# Patient Record
Sex: FEMALE | Race: WHITE | NOT HISPANIC OR LATINO | Employment: FULL TIME | URBAN - METROPOLITAN AREA
[De-identification: names, ages, dates, MRNs, and addresses within clinical notes are randomized per-mention and may not be internally consistent; named-entity substitution may affect disease eponyms.]

---

## 2018-01-17 ENCOUNTER — APPOINTMENT (OUTPATIENT)
Dept: WOMENS IMAGING | Facility: HOSPITAL | Age: 53
End: 2018-01-17

## 2018-01-17 PROCEDURE — 77063 BREAST TOMOSYNTHESIS BI: CPT | Performed by: RADIOLOGY

## 2018-01-17 PROCEDURE — 77067 SCR MAMMO BI INCL CAD: CPT | Performed by: RADIOLOGY

## 2018-06-05 ENCOUNTER — OFFICE VISIT (OUTPATIENT)
Dept: FAMILY MEDICINE CLINIC | Facility: CLINIC | Age: 53
End: 2018-06-05

## 2018-06-05 VITALS
BODY MASS INDEX: 29.66 KG/M2 | WEIGHT: 189 LBS | RESPIRATION RATE: 15 BRPM | HEIGHT: 67 IN | SYSTOLIC BLOOD PRESSURE: 122 MMHG | DIASTOLIC BLOOD PRESSURE: 68 MMHG | TEMPERATURE: 98.7 F | OXYGEN SATURATION: 100 % | HEART RATE: 87 BPM

## 2018-06-05 DIAGNOSIS — Z23 NEED FOR TDAP VACCINATION: ICD-10-CM

## 2018-06-05 DIAGNOSIS — N39.3 STRESS INCONTINENCE: ICD-10-CM

## 2018-06-05 DIAGNOSIS — Z91.89 AT HIGH RISK FOR INFECTION: ICD-10-CM

## 2018-06-05 DIAGNOSIS — Z12.11 SCREENING FOR COLON CANCER: ICD-10-CM

## 2018-06-05 DIAGNOSIS — Z00.00 ENCOUNTER FOR HEALTH MAINTENANCE EXAMINATION: Primary | ICD-10-CM

## 2018-06-05 PROBLEM — B00.9 RECURRENT HERPES SIMPLEX: Status: ACTIVE | Noted: 2018-06-05

## 2018-06-05 PROCEDURE — 90715 TDAP VACCINE 7 YRS/> IM: CPT | Performed by: FAMILY MEDICINE

## 2018-06-05 PROCEDURE — 90471 IMMUNIZATION ADMIN: CPT | Performed by: FAMILY MEDICINE

## 2018-06-05 PROCEDURE — 99396 PREV VISIT EST AGE 40-64: CPT | Performed by: FAMILY MEDICINE

## 2018-06-05 RX ORDER — VALACYCLOVIR HYDROCHLORIDE 1 G/1
1000 TABLET, FILM COATED ORAL DAILY
Refills: 11 | COMMUNITY
Start: 2018-05-16

## 2018-06-05 RX ORDER — ESTRADIOL 0.1 MG/D
FILM, EXTENDED RELEASE TRANSDERMAL
Refills: 11 | COMMUNITY
Start: 2018-05-16 | End: 2020-01-14

## 2018-06-05 NOTE — PROGRESS NOTES
Subjective   Rachelle Corbin is a 52 y.o. female.     Into establish care for health maintenance evaluation.  This is generally healthy lady.  She complains today of problems with urinary stress incontinence.  She has had a vaginal hysterectomy and had 1 vaginal childbirth.  She does have a gynecologist that she hasn't seen regarding this.  One outbreak of herpes year.  That's now.  College he takes care of mammograms and bone density etc.  He and her  both are really seriously trying to develop a healthier lifestyle with more exercise.  She does go to the gym for spinning.  They are trying to watch her diet.  She is reading [employer] YE[employer] WOM[employer] and  is reading [employer] N[employer] YEAR.    She has never had a colonoscopy in needs tetanus updated.  Review of systems is negative other than as above.          The following portions of the patient's history were reviewed and updated as appropriate: allergies, current medications, past family history, past medical history, past social history, past surgical history and problem list.    Review of Systems   Constitutional: Negative.  Negative for chills and fever.   HENT: Negative.  Negative for congestion, rhinorrhea and sore throat.    Eyes: Negative.  Negative for discharge and visual disturbance.   Respiratory: Negative.  Negative for cough and shortness of breath.    Cardiovascular: Negative.  Negative for chest pain.   Gastrointestinal: Negative.  Negative for abdominal pain, constipation, diarrhea, nausea and vomiting.   Endocrine: Negative.  Negative for polydipsia.   Genitourinary: Positive for difficulty urinating (stress incontinence). Negative for dysuria and hematuria.        Genital herpes outbreak     Musculoskeletal: Negative.  Negative for arthralgias and myalgias.   Skin: Negative.  Negative for rash.   Allergic/Immunologic: Negative.    Neurological: Negative.  Negative for weakness, numbness and headaches.   Hematological: Negative.  Does  not bruise/bleed easily.   Psychiatric/Behavioral: Negative.  Negative for dysphoric mood. The patient is not nervous/anxious.    All other systems reviewed and are negative.      Objective   Physical Exam   Constitutional: She is oriented to person, place, and time. She appears well-developed and well-nourished.   HENT:   Head: Normocephalic and atraumatic.   Right Ear: External ear normal.   Left Ear: External ear normal.   Mouth/Throat: No oropharyngeal exudate.   Eyes: Conjunctivae, EOM and lids are normal. Pupils are equal, round, and reactive to light. Right eye exhibits no discharge. Left eye exhibits no discharge. No scleral icterus.   Neck: Trachea normal, normal range of motion and phonation normal. Neck supple. No thyromegaly present.   Cardiovascular: Normal rate, regular rhythm and normal heart sounds.  Exam reveals no gallop and no friction rub.    No murmur heard.  Pulmonary/Chest: Effort normal and breath sounds normal. No stridor. She has no wheezes. She has no rales.   Abdominal: Soft. She exhibits no distension. There is no tenderness.   Musculoskeletal: Normal range of motion. She exhibits no edema.   Lymphadenopathy:     She has no cervical adenopathy.   Neurological: She is alert and oriented to person, place, and time. She has normal strength. No cranial nerve deficit.   Skin: Skin is warm, dry and intact. No petechiae and no rash noted. No cyanosis. Nails show no clubbing.   Psychiatric: She has a normal mood and affect. Her speech is normal and behavior is normal. Judgment and thought content normal. Cognition and memory are normal.   Nursing note and vitals reviewed.    Over 40 minutes face time with over half of that in counseling and education.    Assessment/Plan   Rachelle was seen today for establish care and annual exam.    Diagnoses and all orders for this visit:    Encounter for health maintenance examination  -     Hemoglobin A1c; Future  -     CBC & Differential; Future  -      Comprehensive Metabolic Panel; Future  -     Lipid Panel; Future  -     Vitamin D 25 Hydroxy; Future  -     TSH Rfx On Abnormal To Free T4; Future  -     Hepatitis C Antibody; Future    Need for Tdap vaccination    Stress incontinence    Screening for colon cancer  -     Ambulatory Referral For Screening Colonoscopy    At high risk for infection  -     Hepatitis C Antibody; Future    Other orders  -     Tdap Vaccine Greater Than or Equal To 6yo IM      Patient Instructions   Keep reading your book--and implementing the diet and exercise things we talked about      Discuss bladder issues with your gynecologist    You were given a tetanus/diphtheria/pertussis booster vaccine today.  This is your once in a lifetime pertussis (whooping cough) booster.  In the future you will need only tetanus/diphtheria.    We will arrange for colonoscopy    I would strongly encourage you to sign up for My Chart using the access code provided with these papers.  This provides an excellent convenient way for you to communicate with us and with any of your [facility] physicians.  Lab and x-ray reports can be viewed, prescription refills and appointments may be requested, and you may send emails to your physician's office.      IT IS VERY IMPORTANT THAT YOU KEEP A PRINTED LIST OF ALL OF YOUR MEDICATIONS AND DOSES AND OF ALL MEDICATION ALLERGIES WITH YOU/ON YOUR PERSON AT ALL TIMES.  THIS IS OF CRITICAL IMPORTANCE IN THE EVENT THAT YOU ARE INJURED OR ILL AND ARE UNABLE TO CONVEY THIS INFORMATION TO THOSE CARING FOR YOU IN AN EMERGENCY SITUATION.            EMR Dragon/Transcription disclaimer:   Much of this encounter note is an electronic transcription/translation of spoken language to printed text. The electronic translation of spoken language may permit erroneous, or at times, nonsensical words or phrases to be inadvertently transcribed; Although I have reviewed the note for such errors, some may still exist. Please contact me with  any questions or concerns about the conduct of this encounter note.

## 2018-06-05 NOTE — PATIENT INSTRUCTIONS
Keep reading your book--and implementing the diet and exercise things we talked about      Discuss bladder issues with your gynecologist    You were given a tetanus/diphtheria/pertussis booster vaccine today.  This is your once in a lifetime pertussis (whooping cough) booster.  In the future you will need only tetanus/diphtheria.    We will arrange for colonoscopy    I would strongly encourage you to sign up for My Chart using the access code provided with these papers.  This provides an excellent convenient way for you to communicate with us and with any of your [facility] physicians.  Lab and x-ray reports can be viewed, prescription refills and appointments may be requested, and you may send emails to your physician's office.      IT IS VERY IMPORTANT THAT YOU KEEP A PRINTED LIST OF ALL OF YOUR MEDICATIONS AND DOSES AND OF ALL MEDICATION ALLERGIES WITH YOU/ON YOUR PERSON AT ALL TIMES.  THIS IS OF CRITICAL IMPORTANCE IN THE EVENT THAT YOU ARE INJURED OR ILL AND ARE UNABLE TO CONVEY THIS INFORMATION TO THOSE CARING FOR YOU IN AN EMERGENCY SITUATION.

## 2018-06-06 ENCOUNTER — RESULTS ENCOUNTER (OUTPATIENT)
Dept: FAMILY MEDICINE CLINIC | Facility: CLINIC | Age: 53
End: 2018-06-06

## 2018-06-06 DIAGNOSIS — Z00.00 ENCOUNTER FOR HEALTH MAINTENANCE EXAMINATION: ICD-10-CM

## 2018-06-06 DIAGNOSIS — Z91.89 AT HIGH RISK FOR INFECTION: ICD-10-CM

## 2018-06-13 LAB
25(OH)D3+25(OH)D2 SERPL-MCNC: 37.3 NG/ML (ref 30–100)
ALBUMIN SERPL-MCNC: 4.4 G/DL (ref 3.5–5.2)
ALBUMIN/GLOB SERPL: 1.5 G/DL
ALP SERPL-CCNC: 112 U/L (ref 39–117)
ALT SERPL-CCNC: 13 U/L (ref 1–33)
AST SERPL-CCNC: 14 U/L (ref 1–32)
BASOPHILS # BLD AUTO: 0.08 10*3/MM3 (ref 0–0.2)
BASOPHILS NFR BLD AUTO: 1.3 % (ref 0–1.5)
BILIRUB SERPL-MCNC: 0.4 MG/DL (ref 0.1–1.2)
BUN SERPL-MCNC: 13 MG/DL (ref 6–20)
BUN/CREAT SERPL: 15.1 (ref 7–25)
CALCIUM SERPL-MCNC: 9.9 MG/DL (ref 8.6–10.5)
CHLORIDE SERPL-SCNC: 104 MMOL/L (ref 98–107)
CHOLEST SERPL-MCNC: 163 MG/DL (ref 0–200)
CO2 SERPL-SCNC: 27.3 MMOL/L (ref 22–29)
CREAT SERPL-MCNC: 0.86 MG/DL (ref 0.57–1)
EOSINOPHIL # BLD AUTO: 0.27 10*3/MM3 (ref 0–0.7)
EOSINOPHIL NFR BLD AUTO: 4.5 % (ref 0.3–6.2)
ERYTHROCYTE [DISTWIDTH] IN BLOOD BY AUTOMATED COUNT: 13.4 % (ref 11.7–13)
GFR SERPLBLD CREATININE-BSD FMLA CKD-EPI: 69 ML/MIN/1.73
GFR SERPLBLD CREATININE-BSD FMLA CKD-EPI: 84 ML/MIN/1.73
GLOBULIN SER CALC-MCNC: 2.9 GM/DL
GLUCOSE SERPL-MCNC: 96 MG/DL (ref 65–99)
HBA1C MFR BLD: 4.8 % (ref 4.8–5.6)
HCT VFR BLD AUTO: 44.5 % (ref 35.6–45.5)
HCV AB S/CO SERPL IA: <0.1 S/CO RATIO (ref 0–0.9)
HDLC SERPL-MCNC: 72 MG/DL (ref 40–60)
HGB BLD-MCNC: 14.4 G/DL (ref 11.9–15.5)
IMM GRANULOCYTES # BLD: 0.01 10*3/MM3 (ref 0–0.03)
IMM GRANULOCYTES NFR BLD: 0.2 % (ref 0–0.5)
LDLC SERPL CALC-MCNC: 76 MG/DL (ref 0–100)
LYMPHOCYTES # BLD AUTO: 1.78 10*3/MM3 (ref 0.9–4.8)
LYMPHOCYTES NFR BLD AUTO: 29.8 % (ref 19.6–45.3)
MCH RBC QN AUTO: 31.2 PG (ref 26.9–32)
MCHC RBC AUTO-ENTMCNC: 32.4 G/DL (ref 32.4–36.3)
MCV RBC AUTO: 96.5 FL (ref 80.5–98.2)
MONOCYTES # BLD AUTO: 0.47 10*3/MM3 (ref 0.2–1.2)
MONOCYTES NFR BLD AUTO: 7.9 % (ref 5–12)
NEUTROPHILS # BLD AUTO: 3.38 10*3/MM3 (ref 1.9–8.1)
NEUTROPHILS NFR BLD AUTO: 56.5 % (ref 42.7–76)
PLATELET # BLD AUTO: 272 10*3/MM3 (ref 140–500)
POTASSIUM SERPL-SCNC: 4.4 MMOL/L (ref 3.5–5.2)
PROT SERPL-MCNC: 7.3 G/DL (ref 6–8.5)
RBC # BLD AUTO: 4.61 10*6/MM3 (ref 3.9–5.2)
SODIUM SERPL-SCNC: 142 MMOL/L (ref 136–145)
TRIGL SERPL-MCNC: 77 MG/DL (ref 0–150)
TSH SERPL DL<=0.005 MIU/L-ACNC: 3.76 MIU/ML (ref 0.27–4.2)
VLDLC SERPL CALC-MCNC: 15.4 MG/DL (ref 5–40)
WBC # BLD AUTO: 5.98 10*3/MM3 (ref 4.5–10.7)

## 2018-08-31 ENCOUNTER — OUTSIDE FACILITY SERVICE (OUTPATIENT)
Dept: SURGERY | Facility: CLINIC | Age: 53
End: 2018-08-31

## 2018-08-31 PROCEDURE — 45378 DIAGNOSTIC COLONOSCOPY: CPT | Performed by: SURGERY

## 2018-12-12 ENCOUNTER — OFFICE VISIT (OUTPATIENT)
Dept: FAMILY MEDICINE CLINIC | Facility: CLINIC | Age: 53
End: 2018-12-12

## 2018-12-12 VITALS
RESPIRATION RATE: 16 BRPM | SYSTOLIC BLOOD PRESSURE: 120 MMHG | DIASTOLIC BLOOD PRESSURE: 88 MMHG | HEART RATE: 84 BPM | HEIGHT: 67 IN | WEIGHT: 191.6 LBS | TEMPERATURE: 98.8 F | OXYGEN SATURATION: 99 % | BODY MASS INDEX: 30.07 KG/M2

## 2018-12-12 DIAGNOSIS — L50.9 URTICARIA: ICD-10-CM

## 2018-12-12 DIAGNOSIS — J06.9 VIRAL URI: Primary | ICD-10-CM

## 2018-12-12 PROCEDURE — 99213 OFFICE O/P EST LOW 20 MIN: CPT | Performed by: FAMILY MEDICINE

## 2018-12-12 RX ORDER — HYDROXYZINE HYDROCHLORIDE 10 MG/1
TABLET, FILM COATED ORAL
Qty: 40 TABLET | Refills: 0 | Status: SHIPPED | OUTPATIENT
Start: 2018-12-12 | End: 2020-02-05

## 2018-12-12 NOTE — PATIENT INSTRUCTIONS
Vaporizer/humidifier to provide enough humidity in sleeping room that you can awaken in the morning without having dry mouth or nose.  Continue this until the air conditioning comes on in the spring.  Will need to keep clean to avoid mold.  Ultrasonic cool mist vaporizers are very effective and inexpensive.    You may use EITHER OTC Dimetapp Childrens Cold & Allergy (for drainage and congestion) OR Dimetapp Childrens Cold & Cough (for drainage, congestion and cough).  Store/generic brand is as good as brand name product.     Recheck for persistence or for worsening, especially after initial improvement     Use the Excedrin Migraine supplemented with ibuprofen 600 mg (3 of the OTC tabs) every 6 hours    For itching, use EITHER Claritin/loratadine OR Zyrtec/cetirizine daily for next week PLUS Zantac/ranitidine 150 mg twice daily for next week.  If needed, add rx antihistamine.    Moisturize with Lubriderm or Eucerin

## 2019-11-12 ENCOUNTER — APPOINTMENT (OUTPATIENT)
Dept: WOMENS IMAGING | Facility: HOSPITAL | Age: 54
End: 2019-11-12

## 2019-11-12 PROCEDURE — 77067 SCR MAMMO BI INCL CAD: CPT | Performed by: RADIOLOGY

## 2019-11-12 PROCEDURE — 77063 BREAST TOMOSYNTHESIS BI: CPT | Performed by: RADIOLOGY

## 2019-11-22 ENCOUNTER — APPOINTMENT (OUTPATIENT)
Dept: WOMENS IMAGING | Facility: HOSPITAL | Age: 54
End: 2019-11-22

## 2019-11-22 PROCEDURE — 77065 DX MAMMO INCL CAD UNI: CPT | Performed by: RADIOLOGY

## 2019-11-22 PROCEDURE — 77061 BREAST TOMOSYNTHESIS UNI: CPT | Performed by: RADIOLOGY

## 2019-12-18 ENCOUNTER — HOSPITAL ENCOUNTER (OUTPATIENT)
Dept: MAMMOGRAPHY | Facility: HOSPITAL | Age: 54
Discharge: HOME | End: 2019-12-18
Admitting: RADIOLOGY

## 2019-12-18 VITALS
HEART RATE: 76 BPM | HEIGHT: 67 IN | TEMPERATURE: 96.6 F | BODY MASS INDEX: 26.37 KG/M2 | DIASTOLIC BLOOD PRESSURE: 85 MMHG | SYSTOLIC BLOOD PRESSURE: 132 MMHG | WEIGHT: 168 LBS | RESPIRATION RATE: 20 BRPM | OXYGEN SATURATION: 99 %

## 2019-12-18 DIAGNOSIS — N63.20 LEFT BREAST MASS: ICD-10-CM

## 2019-12-18 PROCEDURE — 77065 DX MAMMO INCL CAD UNI: CPT

## 2019-12-18 RX ORDER — DIAZEPAM 5 MG/1
5 TABLET ORAL ONCE AS NEEDED
Status: DISCONTINUED | OUTPATIENT
Start: 2019-12-18 | End: 2019-12-18

## 2019-12-18 NOTE — NURSING NOTE
Patient was positioned and repositioned in multiple attempts to visualize the calcifications. Unable to complete procedure due to location of calcifications and limitations of positioning. Dr. Pickering reviewed images in depth with patient and her  and discussed options. Patient will speak with Dr. Somers.

## 2019-12-18 NOTE — H&P
Name: Rachelle Corbin ADMIT: 2019   : 1965  PCP: Juan Miguel Oneill MD    MRN: [id number] LOS: 0 days   AGE/SEX: 54 y.o. female  ROOM: Room/bed info not found       Chief complaint left breast calcifications    Present Illness or Internal History:  Patient is a 54 y.o. female presents with left breast calcifications     Past Surgical History:  Past Surgical History:   Procedure Laterality Date   • BREAST BIOPSY Right     Benign   • BREAST LUMPECTOMY Right     Benign   • LACRIMAL DUCT PROBING W/ STENT PLACEMENT Right    • TONSILLECTOMY     • VAGINAL HYSTERECTOMY         Past Medical History:  Past Medical History:   Diagnosis Date   • History of iron deficiency anemia     Menorrhagia prior to hysterectomy   • Kidney stones    • Stress incontinence        Home Medications:    (Not in a hospital admission)    Allergies:  Patient has no known allergies.    Family History:  Family History   Problem Relation Age of Onset   • Coronary artery disease Father 48         at 54   • Coronary artery disease Maternal Grandmother         fatal MI at 70   • Coronary artery disease Paternal Grandfather    • Thyroid cancer Sister    • Breast cancer Neg Hx    • Colon cancer Neg Hx    • Diabetes Neg Hx    • Hypotension Neg Hx    • Hypertension Neg Hx        Social History:  Social History     Tobacco Use   • Smoking status: Former Smoker   • Smokeless tobacco: Never Used   • Tobacco comment: 25 yrs quit   Substance Use Topics   • Alcohol use: Not on file     Comment: 3 drinks/week   • Drug use: No        Objective     Physical Exam:    No exam performed today,    Vital Signs  Temp:  [96.6 °F (35.9 °C)] 96.6 °F (35.9 °C)  Heart Rate:  [76] 76  Resp:  [20] 20  BP: (132)/(85) 132/85    Anticipated Surgical Procedure:  Stereotactic guided left breast biopsy with clip placement    The risks, benefits and alternatives of this procedure have been discussed with the patient or responsible party: Yes        Kevyn GARIBAY  Jr. Pickering MD  12/18/19  10:07 AM

## 2020-01-14 ENCOUNTER — PREP FOR SURGERY (OUTPATIENT)
Dept: OTHER | Facility: HOSPITAL | Age: 55
End: 2020-01-14

## 2020-01-14 ENCOUNTER — OFFICE VISIT (OUTPATIENT)
Dept: MAMMOGRAPHY | Facility: CLINIC | Age: 55
End: 2020-01-14

## 2020-01-14 VITALS
DIASTOLIC BLOOD PRESSURE: 80 MMHG | BODY MASS INDEX: 25.76 KG/M2 | HEIGHT: 68 IN | WEIGHT: 170 LBS | SYSTOLIC BLOOD PRESSURE: 130 MMHG

## 2020-01-14 DIAGNOSIS — R92.1 BREAST CALCIFICATIONS: Primary | ICD-10-CM

## 2020-01-14 DIAGNOSIS — R92.1 BREAST CALCIFICATIONS ON MAMMOGRAM: Primary | ICD-10-CM

## 2020-01-14 PROCEDURE — 99204 OFFICE O/P NEW MOD 45 MIN: CPT | Performed by: SURGERY

## 2020-01-14 RX ORDER — CEFAZOLIN SODIUM 2 G/100ML
2 INJECTION, SOLUTION INTRAVENOUS ONCE
Status: CANCELLED | OUTPATIENT
Start: 2020-02-12 | End: 2020-01-14

## 2020-01-14 NOTE — PROGRESS NOTES
Chief Complaint: Rachelle Corbin is a 54 y.o.. female here today for Abnormal Breast Imaging        History of Present Illness:  Patient presents with abnormal breast imaging. Unsuccessful attempt at a left breast biopsy. Referred to Dr. Eduardo to excise.     She is a nice 54-year-old white female who does a nice job of yearly mammography.  She does have dense breast tissue and on her most recent mammograms she was noted to have some punctate calcifications with a grouped distribution in the posterior one third of the left breast in the upper outer quadrant.  These were apparently new since 2008.  Biopsy of these was recommended but after significant attempts to do this, the patient was unable to have a stereotactic biopsy performed.  She is here today to talk about the possibility of a needle localized excisional biopsy.  The patient denies any symptoms related to her breasts such as nipple discharge or breast pain or breast mass.  I have personally reviewed the imaging studies.  The calcifications are rather deep in the breast but appeared to be pleomorphic and somewhat clustered or at least indeterminate.    The patient has no prior history of breast biopsy.  Her family history is negative for breast or ovarian cancer.  She is currently not on any hormone replacement therapy.      Review of Systems:  Review of Systems   Skin:        The patient denies any noticeable changes to the skin of the breast.    All other systems reviewed and are negative.     I have reviewed the ROS as documented by the MA/LPN/RN Harshad Eduardo MD      Past Medical and Surgical History:  Breast Biopsy History:  Patient has had the following breast biopsies:2004 Right-benign  Breast Cancer HIstory:  Patient does not have a past medical history of breast cancer.  Breast Operations, and year:  2004 Lumpectomy-benign  Social History     Tobacco Use   Smoking Status Former Smoker   Smokeless Tobacco Never Used   Tobacco Comment    25  yrs quit     Obstetric History:  Patient is postmenopausal due to removal of her uterus in the following year:   Number of pregnancies:1  Number of live births: 1  Number of abortions or miscarriages: 0  Age of delivery of first child: 30  Patient breast fed, for the following lenth of time:6 months  Length of time taking birth control pills:5 plus years  Patient is presently taking the following hormone replacement:Estrogel x 3 weeks    Past Surgical History:   Procedure Laterality Date   • BREAST BIOPSY Right     Benign   • BREAST LUMPECTOMY Right     Benign   • LACRIMAL DUCT PROBING W/ STENT PLACEMENT Right    • TONSILLECTOMY     • VAGINAL HYSTERECTOMY         Past Medical History:   Diagnosis Date   • History of iron deficiency anemia     Menorrhagia prior to hysterectomy   • Kidney stones    • Stress incontinence        Prior Hospitalizations, other than for surgery or childbirth, and year:  None    Social History:  Patient is .  Patient has one sons.    Family History:  Family History   Problem Relation Age of Onset   • Coronary artery disease Father 48         at 54   • Coronary artery disease Maternal Grandmother         fatal MI at 70   • Coronary artery disease Paternal Grandfather    • Thyroid cancer Sister    • Lung cancer Paternal Uncle    • Breast cancer Neg Hx    • Colon cancer Neg Hx    • Diabetes Neg Hx    • Hypotension Neg Hx    • Hypertension Neg Hx        Vital Signs:  Vitals:    20 1309   BP: 130/80       Medications:    Current Outpatient Prescriptions:     Current Outpatient Medications:   •  Estradiol (ESTROGEL) 0.75 MG/1.25 GM (0.06%) topical gel, Place 1.25 g on the skin as directed by provider Daily., Disp: , Rfl:   •  hydrOXYzine (ATARAX) 10 MG tablet, 1-3 tabs PO Q6H prn itching, Disp: 40 tablet, Rfl: 0  •  valACYclovir (VALTREX) 1000 MG tablet, Take 1,000 mg by mouth Daily., Disp: , Rfl: 11    Physical Examination:  General Appearance:   Patient is in no distress.   She is well kept and has an average build.   Psychiatric:  Patient with appropriate mood and affect. Alert and oriented to self, time, and place.    Breast, RIGHT:  large sized, symmetric with the contralateral side.  Breast skin is without erythema, edema, rashes.  There are no visible abnormalities upon inspection during the arm-raising maneuver or with hands on hips in the sitting position. There is no nipple retraction, discharge or nipple/areolar skin changes.There are no masses palpable in the sitting or supine positions.  She does have rather dense breast tissue but no dominant masses.    Breast, LEFT:  large sized, symmetric with the contralateral side.  Breast skin is without erythema, edema, rashes.  There are no visible abnormalities upon inspection during the arm-raising maneuver or with hands on hips in the sitting position. There is no nipple retraction, discharge or nipple/areolar skin changes.There are no masses palpable in the sitting or supine positions.  She does have dense breast tissue particularly in the upper outer quadrants.    Lymphatic:  There is no axillary, cervical, infraclavicular, or supraclavicular adenopathy bilaterally.  Eyes:  Pupils are round and reactive to light.  Cardiovascular:  Heart rate and rhythm are regular.  Respiratory:  Lungs are clear bilaterally with no crackles or wheezes in any lung field.  Gastrointestinal:  Abdomen is soft, nondistended, and nontender.  There was no obvious hepatosplenomegaly or abdominal mass.  There are  scars from previous hysterectomy.    Musculoskeletal:  Good strength in all 4 extremities.   There is good range of motion in both shoulders.    Skin:  No new skin lesions or rashes on the skin excluding the breast (see breast exam above).    Assessment:  1. Breast calcifications on mammogram          Plan:  We had a nice discussion regarding calcifications today.  The patient understands that these calcifications typically present because of  ductal obstruction.  There are patterns that are more suspicious than others and hers would seem to fit that.  Unfortunately they are deep in the breast and fairly high in the upper outer quadrant.  This makes access to them rather difficult with the stereotactic approach.  I have described what is involved with a needle localized excisional biopsy.  The patient does understand the small risk of infection, bleeding, or an anesthetic complication.  She does wish to proceed and those arrangements are being made.      CPT coding:    Next Appointment:  No follow-ups on file.            EMR Dragon/transcription disclaimer:    Much of this encounter note is an electronic transcription/translocation of spoken language to printed text.  The electronic translation of spoken language may permit erroneous, or at times, nonsensical words or phrases to be inadvertently transcribed.  Although I have reviewed the note from such areas, some may still exist.

## 2020-01-15 ENCOUNTER — TELEPHONE (OUTPATIENT)
Dept: MAMMOGRAPHY | Facility: CLINIC | Age: 55
End: 2020-01-15

## 2020-01-15 ENCOUNTER — TRANSCRIBE ORDERS (OUTPATIENT)
Dept: ADMINISTRATIVE | Facility: HOSPITAL | Age: 55
End: 2020-01-15

## 2020-01-15 PROBLEM — R92.1 BREAST CALCIFICATIONS: Status: ACTIVE | Noted: 2020-01-15

## 2020-01-15 NOTE — TELEPHONE ENCOUNTER
Patient scheduled for PAT on 2/5/2020 at 900 am     Voicemail message was left for patient to call and confirm this time/ date

## 2020-02-05 ENCOUNTER — APPOINTMENT (OUTPATIENT)
Dept: PREADMISSION TESTING | Facility: HOSPITAL | Age: 55
End: 2020-02-05

## 2020-02-05 VITALS
TEMPERATURE: 98.3 F | DIASTOLIC BLOOD PRESSURE: 85 MMHG | BODY MASS INDEX: 26.86 KG/M2 | SYSTOLIC BLOOD PRESSURE: 127 MMHG | HEIGHT: 68 IN | HEART RATE: 75 BPM | OXYGEN SATURATION: 99 % | RESPIRATION RATE: 16 BRPM | WEIGHT: 177.25 LBS

## 2020-02-05 LAB
ANION GAP SERPL CALCULATED.3IONS-SCNC: 10.8 MMOL/L (ref 5–15)
BUN BLD-MCNC: 12 MG/DL (ref 6–20)
BUN/CREAT SERPL: 15.8 (ref 7–25)
CALCIUM SPEC-SCNC: 9.1 MG/DL (ref 8.6–10.5)
CHLORIDE SERPL-SCNC: 105 MMOL/L (ref 98–107)
CO2 SERPL-SCNC: 25.2 MMOL/L (ref 22–29)
CREAT BLD-MCNC: 0.76 MG/DL (ref 0.57–1)
DEPRECATED RDW RBC AUTO: 42.1 FL (ref 37–54)
ERYTHROCYTE [DISTWIDTH] IN BLOOD BY AUTOMATED COUNT: 12.8 % (ref 12.3–15.4)
GFR SERPL CREATININE-BSD FRML MDRD: 79 ML/MIN/1.73
GLUCOSE BLD-MCNC: 100 MG/DL (ref 65–99)
HCT VFR BLD AUTO: 41.2 % (ref 34–46.6)
HGB BLD-MCNC: 14.2 G/DL (ref 12–15.9)
MCH RBC QN AUTO: 31.2 PG (ref 26.6–33)
MCHC RBC AUTO-ENTMCNC: 34.5 G/DL (ref 31.5–35.7)
MCV RBC AUTO: 90.5 FL (ref 79–97)
PLATELET # BLD AUTO: 241 10*3/MM3 (ref 140–450)
PMV BLD AUTO: 8.4 FL (ref 6–12)
POTASSIUM BLD-SCNC: 4.3 MMOL/L (ref 3.5–5.2)
RBC # BLD AUTO: 4.55 10*6/MM3 (ref 3.77–5.28)
SODIUM BLD-SCNC: 141 MMOL/L (ref 136–145)
WBC NRBC COR # BLD: 3.89 10*3/MM3 (ref 3.4–10.8)

## 2020-02-05 PROCEDURE — 85027 COMPLETE CBC AUTOMATED: CPT | Performed by: SURGERY

## 2020-02-05 PROCEDURE — 80048 BASIC METABOLIC PNL TOTAL CA: CPT | Performed by: SURGERY

## 2020-02-05 PROCEDURE — 36415 COLL VENOUS BLD VENIPUNCTURE: CPT

## 2020-02-05 NOTE — DISCHARGE INSTRUCTIONS
NO medications the morning of surgery:        General Instructions:  • Do not eat solid food after midnight the night before surgery.  • You may drink clear liquids day of surgery but must stop at least one hour before your hospital arrival time @ 0430 AM STOP DRINKING!!!  • It is beneficial for you to have a clear drink that contains carbohydrates the day of surgery.  We suggest a 12 to 20 ounce bottle of Gatorade or Powerade for non-diabetic patients or a 12 to 20 ounce bottle of G2 or Powerade Zero for diabetic patients.     Clear liquids are liquids you can see through.  Nothing red in color.     Plain water                               Sports drinks  Sodas                                   Gelatin (Jell-O)  Fruit juices without pulp such as white grape juice and apple juice  Popsicles that contain no fruit or yogurt  Tea or coffee (no cream or milk added)  Gatorade / Powerade  G2 / Powerade Zero    • Patients who avoid smoking, chewing tobacco and alcohol for 4 weeks prior to surgery have a reduced risk of post-operative complications.  Quit smoking as many days before surgery as you can.  • Do not smoke, use chewing tobacco or drink alcohol the day of surgery.   • If applicable bring your C-PAP/ BI-PAP machine.  • Bring any papers given to you in the doctor’s office.  • Wear clean comfortable clothes.  • Do not wear contact lenses, false eyelashes or make-up.  Bring a case for your glasses.   • Bring crutches or walker if applicable.  • Remove all piercings.  Leave jewelry and any other valuables at home.  • Hair extensions with metal clips must be removed prior to surgery.  • The Pre-Admission Testing nurse will instruct you to bring medications if unable to obtain an accurate list in Pre-Admission Testing.        Preventing a Surgical Site Infection:  • For 2 to 3 days before surgery, avoid shaving with a razor because the razor can irritate skin and make it easier to develop an infection.    • Any areas of  open skin can increase the risk of a post-operative wound infection by allowing bacteria to enter and travel throughout the body.  Notify your surgeon if you have any skin wounds / rashes even if it is not near the expected surgical site.  The area will need assessed to determine if surgery should be delayed until it is healed.  • The night prior to surgery sleep in a clean bed with clean clothing.  Do not allow pets to sleep with you.  • Shower on the morning of surgery using a fresh bar of anti-bacterial soap (such as Dial) and clean washcloth.  Dry with a clean towel and dress in clean clothing.  • Ask your surgeon if you will be receiving antibiotics prior to surgery.  • Make sure you, your family, and all healthcare providers clean their hands with soap and water or an alcohol based hand  before caring for you or your wound.    Day of surgery: 02- ARRIVE @ 0530 AM REPORT TO THE MAIN OR   Your arrival time is approximately two hours before your scheduled surgery time.  Upon arrival, a Pre-op nurse and Anesthesiologist will review your health history, obtain vital signs, and answer questions you may have.  The only belongings needed at this time will be a list of your home medications and if applicable your C-PAP/BI-PAP machine.  If you are staying overnight your family can leave the rest of your belongings in the car and bring them to your room later.  A Pre-op nurse will start an IV and you may receive medication in preparation for surgery, including something to help you relax.  Your family will be able to see you in the Pre-op area.  Two visitors at a time will be allowed in the Pre-op room.  While you are in surgery your family should notify the waiting room  if they leave the waiting room area and provide a contact phone number.    Please be aware that surgery does come with discomfort.  We want to make every effort to control your discomfort so please discuss any uncontrolled  symptoms with your nurse.   Your doctor will most likely have prescribed pain medications.      If you are going home after surgery you will receive individualized written care instructions before being discharged.  A responsible adult must drive you to and from the hospital on the day of your surgery and stay with you for 24 hours.    If you are staying overnight following surgery, you will be transported to your hospital room following the recovery period.  [facility] has all private rooms.    If you have any questions please call Pre-Admission Testing at [phone].  Deductibles and co-payments are collected on the day of service. Please be prepared to pay the required co-pay, deductible or deposit on the day of service as defined by your plan.  2% CHLORHEXIDINE GLUCONATE* CLOTH  Preparing or “prepping” skin before surgery can reduce the risk of infection at the surgical site. To make the process easier, [facility] has chosen disposable cloths moistened with a rinse-free, 2% Chlorhexidine Gluconate (CHG) antiseptic solution. The steps below outline the prepping process and should be carefully followed.        Use the prep cloth on the area that is circled in the diagram             Directions Night before Surgery 02- PM PRIOR TO SURGERY   1) Shower using a fresh bar of anti-bacterial soap (such as Dial) and clean washcloth.  Use a clean towel to completely dry your skin.  2) Do not use any lotions, oils or creams on your skin.  3) Open the package and remove 1 cloth, wipe your skin for 30 seconds in a circular motion.  Allow to dry for 3 minutes.  4) Repeat #3 with second cloth.  5) Do not touch your eyes, ears, or mouth with the prep cloth.  6) Allow the wet prep solution to air dry.  7) Discard the prep cloth and wash your hands with soap and water.   8) Dress in clean bed clothes and sleep on fresh clean bed sheets.   9) You may experience some temporary itching after  the prep.    Directions Day of Surgery 02- AM PRIOR TO SURGERY   1) Repeat steps 1,2,3,4,5,6,7, and 9.   2) Dress in clean clothes before coming to the hospital.

## 2020-02-12 ENCOUNTER — HOSPITAL ENCOUNTER (OUTPATIENT)
Facility: HOSPITAL | Age: 55
Setting detail: HOSPITAL OUTPATIENT SURGERY
Discharge: HOME | End: 2020-02-12
Attending: SURGERY | Admitting: SURGERY

## 2020-02-12 ENCOUNTER — ANESTHESIA EVENT (OUTPATIENT)
Dept: PERIOP | Facility: HOSPITAL | Age: 55
End: 2020-02-12

## 2020-02-12 ENCOUNTER — HOSPITAL ENCOUNTER (OUTPATIENT)
Dept: MAMMOGRAPHY | Facility: HOSPITAL | Age: 55
Discharge: HOME | End: 2020-02-12

## 2020-02-12 ENCOUNTER — APPOINTMENT (OUTPATIENT)
Dept: GENERAL RADIOLOGY | Facility: HOSPITAL | Age: 55
End: 2020-02-12

## 2020-02-12 ENCOUNTER — ANESTHESIA (OUTPATIENT)
Dept: PERIOP | Facility: HOSPITAL | Age: 55
End: 2020-02-12

## 2020-02-12 VITALS
BODY MASS INDEX: 27.44 KG/M2 | DIASTOLIC BLOOD PRESSURE: 84 MMHG | HEART RATE: 69 BPM | OXYGEN SATURATION: 99 % | WEIGHT: 181.06 LBS | HEIGHT: 68 IN | TEMPERATURE: 97.7 F | SYSTOLIC BLOOD PRESSURE: 131 MMHG | RESPIRATION RATE: 16 BRPM

## 2020-02-12 DIAGNOSIS — R92.1 BREAST CALCIFICATIONS: ICD-10-CM

## 2020-02-12 DIAGNOSIS — R92.1 CALCIFICATION OF LEFT BREAST: ICD-10-CM

## 2020-02-12 PROCEDURE — 25010000003 CEFAZOLIN IN DEXTROSE 2-4 GM/100ML-% SOLUTION: Performed by: SURGERY

## 2020-02-12 PROCEDURE — 25010000003 LIDOCAINE 1 % SOLUTION: Performed by: SURGERY

## 2020-02-12 PROCEDURE — 25010000002 FENTANYL CITRATE (PF) 100 MCG/2ML SOLUTION: Performed by: NURSE ANESTHETIST, CERTIFIED REGISTERED

## 2020-02-12 PROCEDURE — 25010000002 ONDANSETRON PER 1 MG: Performed by: NURSE ANESTHETIST, CERTIFIED REGISTERED

## 2020-02-12 PROCEDURE — 19125 EXCISION BREAST LESION: CPT | Performed by: SURGERY

## 2020-02-12 PROCEDURE — 25010000002 DEXAMETHASONE PER 1 MG: Performed by: NURSE ANESTHETIST, CERTIFIED REGISTERED

## 2020-02-12 PROCEDURE — 88307 TISSUE EXAM BY PATHOLOGIST: CPT | Performed by: SURGERY

## 2020-02-12 PROCEDURE — 25010000002 PROPOFOL 10 MG/ML EMULSION: Performed by: NURSE ANESTHETIST, CERTIFIED REGISTERED

## 2020-02-12 PROCEDURE — 88360 TUMOR IMMUNOHISTOCHEM/MANUAL: CPT | Performed by: SURGERY

## 2020-02-12 PROCEDURE — 76098 X-RAY EXAM SURGICAL SPECIMEN: CPT

## 2020-02-12 RX ORDER — DEXAMETHASONE SODIUM PHOSPHATE 10 MG/ML
INJECTION INTRAMUSCULAR; INTRAVENOUS AS NEEDED
Status: DISCONTINUED | OUTPATIENT
Start: 2020-02-12 | End: 2020-02-12 | Stop reason: SURG

## 2020-02-12 RX ORDER — ONDANSETRON 2 MG/ML
INJECTION INTRAMUSCULAR; INTRAVENOUS AS NEEDED
Status: DISCONTINUED | OUTPATIENT
Start: 2020-02-12 | End: 2020-02-12 | Stop reason: SURG

## 2020-02-12 RX ORDER — FAMOTIDINE 10 MG/ML
20 INJECTION, SOLUTION INTRAVENOUS ONCE
Status: COMPLETED | OUTPATIENT
Start: 2020-02-12 | End: 2020-02-12

## 2020-02-12 RX ORDER — FENTANYL CITRATE 50 UG/ML
50 INJECTION, SOLUTION INTRAMUSCULAR; INTRAVENOUS
Status: DISCONTINUED | OUTPATIENT
Start: 2020-02-12 | End: 2020-02-12 | Stop reason: HOSPADM

## 2020-02-12 RX ORDER — FLUMAZENIL 0.1 MG/ML
0.2 INJECTION INTRAVENOUS AS NEEDED
Status: DISCONTINUED | OUTPATIENT
Start: 2020-02-12 | End: 2020-02-12 | Stop reason: HOSPADM

## 2020-02-12 RX ORDER — HYDROMORPHONE HYDROCHLORIDE 1 MG/ML
0.5 INJECTION, SOLUTION INTRAMUSCULAR; INTRAVENOUS; SUBCUTANEOUS
Status: DISCONTINUED | OUTPATIENT
Start: 2020-02-12 | End: 2020-02-12 | Stop reason: HOSPADM

## 2020-02-12 RX ORDER — ACETAMINOPHEN 325 MG/1
650 TABLET ORAL ONCE AS NEEDED
Status: DISCONTINUED | OUTPATIENT
Start: 2020-02-12 | End: 2020-02-12 | Stop reason: HOSPADM

## 2020-02-12 RX ORDER — HYDROCODONE BITARTRATE AND ACETAMINOPHEN 5; 325 MG/1; MG/1
1-2 TABLET ORAL EVERY 4 HOURS PRN
Qty: 8 TABLET | Refills: 0 | Status: SHIPPED | OUTPATIENT
Start: 2020-02-12 | End: 2020-06-15

## 2020-02-12 RX ORDER — PROPOFOL 10 MG/ML
VIAL (ML) INTRAVENOUS AS NEEDED
Status: DISCONTINUED | OUTPATIENT
Start: 2020-02-12 | End: 2020-02-12 | Stop reason: SURG

## 2020-02-12 RX ORDER — EPHEDRINE SULFATE 50 MG/ML
5 INJECTION, SOLUTION INTRAVENOUS ONCE AS NEEDED
Status: DISCONTINUED | OUTPATIENT
Start: 2020-02-12 | End: 2020-02-12 | Stop reason: HOSPADM

## 2020-02-12 RX ORDER — BUPIVACAINE HYDROCHLORIDE 2.5 MG/ML
INJECTION, SOLUTION INFILTRATION; PERINEURAL AS NEEDED
Status: DISCONTINUED | OUTPATIENT
Start: 2020-02-12 | End: 2020-02-12 | Stop reason: HOSPADM

## 2020-02-12 RX ORDER — SODIUM CHLORIDE 0.9 % (FLUSH) 0.9 %
3 SYRINGE (ML) INJECTION EVERY 12 HOURS SCHEDULED
Status: DISCONTINUED | OUTPATIENT
Start: 2020-02-12 | End: 2020-02-12 | Stop reason: HOSPADM

## 2020-02-12 RX ORDER — PROMETHAZINE HYDROCHLORIDE 25 MG/1
25 TABLET ORAL ONCE AS NEEDED
Status: DISCONTINUED | OUTPATIENT
Start: 2020-02-12 | End: 2020-02-12 | Stop reason: HOSPADM

## 2020-02-12 RX ORDER — HYDROCODONE BITARTRATE AND ACETAMINOPHEN 7.5; 325 MG/1; MG/1
1 TABLET ORAL ONCE AS NEEDED
Status: COMPLETED | OUTPATIENT
Start: 2020-02-12 | End: 2020-02-12

## 2020-02-12 RX ORDER — ACETAMINOPHEN 500 MG
1000 TABLET ORAL EVERY 6 HOURS PRN
COMMUNITY

## 2020-02-12 RX ORDER — PROMETHAZINE HYDROCHLORIDE 25 MG/ML
6.25 INJECTION, SOLUTION INTRAMUSCULAR; INTRAVENOUS
Status: DISCONTINUED | OUTPATIENT
Start: 2020-02-12 | End: 2020-02-12 | Stop reason: HOSPADM

## 2020-02-12 RX ORDER — DIAZEPAM 5 MG/1
10 TABLET ORAL ONCE AS NEEDED
Status: DISCONTINUED | OUTPATIENT
Start: 2020-02-12 | End: 2020-02-12 | Stop reason: HOSPADM

## 2020-02-12 RX ORDER — ONDANSETRON 2 MG/ML
4 INJECTION INTRAMUSCULAR; INTRAVENOUS ONCE AS NEEDED
Status: DISCONTINUED | OUTPATIENT
Start: 2020-02-12 | End: 2020-02-12 | Stop reason: HOSPADM

## 2020-02-12 RX ORDER — FENTANYL CITRATE 50 UG/ML
INJECTION, SOLUTION INTRAMUSCULAR; INTRAVENOUS AS NEEDED
Status: DISCONTINUED | OUTPATIENT
Start: 2020-02-12 | End: 2020-02-12 | Stop reason: SURG

## 2020-02-12 RX ORDER — DIPHENHYDRAMINE HYDROCHLORIDE 50 MG/ML
12.5 INJECTION INTRAMUSCULAR; INTRAVENOUS
Status: DISCONTINUED | OUTPATIENT
Start: 2020-02-12 | End: 2020-02-12 | Stop reason: HOSPADM

## 2020-02-12 RX ORDER — LIDOCAINE HYDROCHLORIDE 10 MG/ML
0.5 INJECTION, SOLUTION EPIDURAL; INFILTRATION; INTRACAUDAL; PERINEURAL ONCE AS NEEDED
Status: DISCONTINUED | OUTPATIENT
Start: 2020-02-12 | End: 2020-02-12 | Stop reason: HOSPADM

## 2020-02-12 RX ORDER — PROMETHAZINE HYDROCHLORIDE 25 MG/ML
12.5 INJECTION, SOLUTION INTRAMUSCULAR; INTRAVENOUS ONCE AS NEEDED
Status: DISCONTINUED | OUTPATIENT
Start: 2020-02-12 | End: 2020-02-12 | Stop reason: HOSPADM

## 2020-02-12 RX ORDER — HYDRALAZINE HYDROCHLORIDE 20 MG/ML
5 INJECTION INTRAMUSCULAR; INTRAVENOUS
Status: DISCONTINUED | OUTPATIENT
Start: 2020-02-12 | End: 2020-02-12 | Stop reason: HOSPADM

## 2020-02-12 RX ORDER — LIDOCAINE HYDROCHLORIDE 10 MG/ML
3 INJECTION, SOLUTION INFILTRATION; PERINEURAL ONCE
Status: COMPLETED | OUTPATIENT
Start: 2020-02-12 | End: 2020-02-12

## 2020-02-12 RX ORDER — MIDAZOLAM HYDROCHLORIDE 1 MG/ML
1 INJECTION INTRAMUSCULAR; INTRAVENOUS
Status: DISCONTINUED | OUTPATIENT
Start: 2020-02-12 | End: 2020-02-12 | Stop reason: HOSPADM

## 2020-02-12 RX ORDER — LIDOCAINE HYDROCHLORIDE 20 MG/ML
INJECTION, SOLUTION INFILTRATION; PERINEURAL AS NEEDED
Status: DISCONTINUED | OUTPATIENT
Start: 2020-02-12 | End: 2020-02-12 | Stop reason: SURG

## 2020-02-12 RX ORDER — NALOXONE HCL 0.4 MG/ML
0.2 VIAL (ML) INJECTION AS NEEDED
Status: DISCONTINUED | OUTPATIENT
Start: 2020-02-12 | End: 2020-02-12 | Stop reason: HOSPADM

## 2020-02-12 RX ORDER — LABETALOL HYDROCHLORIDE 5 MG/ML
5 INJECTION, SOLUTION INTRAVENOUS
Status: DISCONTINUED | OUTPATIENT
Start: 2020-02-12 | End: 2020-02-12 | Stop reason: HOSPADM

## 2020-02-12 RX ORDER — SODIUM CHLORIDE, SODIUM LACTATE, POTASSIUM CHLORIDE, CALCIUM CHLORIDE 600; 310; 30; 20 MG/100ML; MG/100ML; MG/100ML; MG/100ML
9 INJECTION, SOLUTION INTRAVENOUS CONTINUOUS
Status: DISCONTINUED | OUTPATIENT
Start: 2020-02-12 | End: 2020-02-12 | Stop reason: HOSPADM

## 2020-02-12 RX ORDER — PROMETHAZINE HYDROCHLORIDE 25 MG/1
25 SUPPOSITORY RECTAL ONCE AS NEEDED
Status: DISCONTINUED | OUTPATIENT
Start: 2020-02-12 | End: 2020-02-12 | Stop reason: HOSPADM

## 2020-02-12 RX ORDER — SODIUM CHLORIDE 0.9 % (FLUSH) 0.9 %
3-10 SYRINGE (ML) INJECTION AS NEEDED
Status: DISCONTINUED | OUTPATIENT
Start: 2020-02-12 | End: 2020-02-12 | Stop reason: HOSPADM

## 2020-02-12 RX ORDER — MAGNESIUM HYDROXIDE 1200 MG/15ML
LIQUID ORAL AS NEEDED
Status: DISCONTINUED | OUTPATIENT
Start: 2020-02-12 | End: 2020-02-12 | Stop reason: HOSPADM

## 2020-02-12 RX ORDER — MIDAZOLAM HYDROCHLORIDE 1 MG/ML
2 INJECTION INTRAMUSCULAR; INTRAVENOUS
Status: DISCONTINUED | OUTPATIENT
Start: 2020-02-12 | End: 2020-02-12 | Stop reason: HOSPADM

## 2020-02-12 RX ORDER — DIPHENHYDRAMINE HCL 25 MG
25 CAPSULE ORAL
Status: DISCONTINUED | OUTPATIENT
Start: 2020-02-12 | End: 2020-02-12 | Stop reason: HOSPADM

## 2020-02-12 RX ORDER — OXYCODONE AND ACETAMINOPHEN 7.5; 325 MG/1; MG/1
1 TABLET ORAL ONCE AS NEEDED
Status: DISCONTINUED | OUTPATIENT
Start: 2020-02-12 | End: 2020-02-12 | Stop reason: HOSPADM

## 2020-02-12 RX ORDER — CEFAZOLIN SODIUM 2 G/100ML
2 INJECTION, SOLUTION INTRAVENOUS ONCE
Status: COMPLETED | OUTPATIENT
Start: 2020-02-12 | End: 2020-02-12

## 2020-02-12 RX ADMIN — FAMOTIDINE 20 MG: 10 INJECTION INTRAVENOUS at 08:46

## 2020-02-12 RX ADMIN — HYDROCODONE BITARTRATE AND ACETAMINOPHEN 1 TABLET: 7.5; 325 TABLET ORAL at 12:00

## 2020-02-12 RX ADMIN — CEFAZOLIN SODIUM 2 G: 2 INJECTION, SOLUTION INTRAVENOUS at 09:08

## 2020-02-12 RX ADMIN — FENTANYL CITRATE 50 MCG: 50 INJECTION INTRAMUSCULAR; INTRAVENOUS at 09:37

## 2020-02-12 RX ADMIN — ONDANSETRON HYDROCHLORIDE 4 MG: 2 SOLUTION INTRAMUSCULAR; INTRAVENOUS at 09:37

## 2020-02-12 RX ADMIN — FENTANYL CITRATE 50 MCG: 50 INJECTION INTRAMUSCULAR; INTRAVENOUS at 09:04

## 2020-02-12 RX ADMIN — PROPOFOL 200 MG: 10 INJECTION, EMULSION INTRAVENOUS at 09:04

## 2020-02-12 RX ADMIN — SODIUM CHLORIDE, POTASSIUM CHLORIDE, SODIUM LACTATE AND CALCIUM CHLORIDE 9 ML/HR: 600; 310; 30; 20 INJECTION, SOLUTION INTRAVENOUS at 06:15

## 2020-02-12 RX ADMIN — DIAZEPAM 10 MG: 5 TABLET ORAL at 07:00

## 2020-02-12 RX ADMIN — LIDOCAINE HYDROCHLORIDE 3 ML: 10 INJECTION, SOLUTION INFILTRATION; PERINEURAL at 08:00

## 2020-02-12 RX ADMIN — DEXAMETHASONE SODIUM PHOSPHATE 8 MG: 10 INJECTION INTRAMUSCULAR; INTRAVENOUS at 09:15

## 2020-02-12 RX ADMIN — LIDOCAINE HYDROCHLORIDE 80 MG: 20 INJECTION, SOLUTION INFILTRATION; PERINEURAL at 09:04

## 2020-02-12 NOTE — ANESTHESIA PROCEDURE NOTES
Airway  Urgency: elective    Date/Time: 2/12/2020 9:07 AM  Airway not difficult    General Information and Staff    Patient location during procedure: OR  CRNA: Torrie Lepe CRNA    Indications and Patient Condition  Indications for airway management: airway protection    Preoxygenated: yes  Mask difficulty assessment: 0 - not attempted    Final Airway Details  Final airway type: supraglottic airway      Successful airway: classic  Size 4    Number of attempts at approach: 1  Assessment: lips, teeth, and gum same as pre-op and atraumatic intubation    Additional Comments  LMA inserted without difficulty.  Lips and teeth intact as preop condition.  No signs or symptoms of gastric regurgitation.  Seal with minimal pressure and secure.

## 2020-02-12 NOTE — ANESTHESIA POSTPROCEDURE EVALUATION
Patient: Rachelle Corbin    Procedure Summary     Date:  02/12/20 Room / Location:  [facility] OR 02 / [facility] MAIN OR    Anesthesia Start:  0855 Anesthesia Stop:  1007    Procedure:  BREAST BIOPSY WITH NEEDLE LOCALIZATION (Left Breast) Diagnosis:       Breast calcifications      (Breast calcifications [R92.1])    Surgeon:  Harshad Eduardo MD Provider:  Kt Redman MD    Anesthesia Type:  general ASA Status:  2          Anesthesia Type: general    Vitals  Vitals Value Taken Time   /74 2/12/2020 10:10 AM   Temp 36.4 °C (97.6 °F) 2/12/2020 10:04 AM   Pulse 69 2/12/2020 10:14 AM   Resp 10 2/12/2020 10:10 AM   SpO2 99 % 2/12/2020 10:14 AM   Vitals shown include unvalidated device data.        Post Anesthesia Care and Evaluation    Patient location during evaluation: PACU  Patient participation: complete - patient participated  Level of consciousness: awake and alert  Pain management: adequate  Airway patency: patent  Anesthetic complications: No anesthetic complications    Cardiovascular status: acceptable  Respiratory status: acceptable  Hydration status: acceptable    Comments: --------------------            02/12/20               1010     --------------------   BP:       118/74     Pulse:      78       Resp:       10       Temp:                SpO2:      98%      --------------------

## 2020-02-12 NOTE — ANESTHESIA PREPROCEDURE EVALUATION
Anesthesia Evaluation     Patient summary reviewed and Nursing notes reviewed   history of anesthetic complications: prolonged sedation               Airway   Mallampati: III  Dental      Pulmonary    (+) a smoker Former,   Cardiovascular - negative cardio ROS    Rhythm: regular  Rate: normal        Neuro/Psych- negative ROS  GI/Hepatic/Renal/Endo    (+)   renal disease stones,     Musculoskeletal (-) negative ROS    Abdominal    Substance History - negative use     OB/GYN negative ob/gyn ROS         Other      history of cancer                    Anesthesia Plan    ASA 2     general     intravenous induction     Anesthetic plan, all risks, benefits, and alternatives have been provided, discussed and informed consent has been obtained with: patient.

## 2020-02-12 NOTE — OP NOTE
Operative note    Preoperative Diagnosis: Microcalcifications left breast    Postoperative Diagnosis: Same    Procedure Performed:left breast needle localized excisional biopsy    Dictating physician and surgeon: Harshad Eduardo MD    Indications-the patient is a 54-year-old white female with recent mammograms showing some indeterminate microcalcifications posteriorly in the upper outer quadrant of the left breast.  These were unapproachable with stereotactic biopsy and she is here for a needle localized excisional biopsy.  EBL: 5 cc    FINDINGS AND DESCRIPTION OF PROCEDURE:     The patient was taken to the operating room after successful needle localization of the involved area.  She was placed on the operating table in the supine position and given adequate general anesthesia. The left was then prepped and draped in sterile fashion.  The involved area was anesthetized with  quarter percent Marcaine plain.  A total of 30 cc was used.   A curvilinear incision was made near the insertion point of the wire and then the guidewire was identified.  Dissection was carried down around the wire in a circumferential fashion and the specimen was removed.  The dissection was carried all the way down to the pectoralis muscle and in fact some fascia was removed.  The specimen was then x-rayed.  The wire was nicely in the middle of our specimen and I could see a few very faint calcifications.  The calcifications were incredibly hard to see on her regular films as well.         The wound was irrigated and hemostasis obtained using electrocautery unit.  The incision was then closed in layers using 3-0 Vicryl suture for the subcutaneous layer and a running 4-0 Vicryl subcuticular stitch for the skin.   Steri-Strips and a Tegaderm dressing were applied.     Sponge and needle counts were correct and the patient was taken to the recovery area in stable condition.    Specimen- left breast tissue

## 2020-02-14 ENCOUNTER — TELEPHONE (OUTPATIENT)
Dept: MAMMOGRAPHY | Facility: CLINIC | Age: 55
End: 2020-02-14

## 2020-02-14 NOTE — TELEPHONE ENCOUNTER
Rash on torso, chest and under arm that started Wednesday night. Same day as surgery. Has been taking Hydrocodone. Has not taken it since last night. No fever and not short of breath. Told her to take Benadryl as directed and stop the Hydrocodone per Dr. Eduardo.

## 2020-02-17 ENCOUNTER — TELEPHONE (OUTPATIENT)
Dept: MAMMOGRAPHY | Facility: CLINIC | Age: 55
End: 2020-02-17

## 2020-02-17 LAB
LAB AP CASE REPORT: NORMAL
LAB AP DIAGNOSIS COMMENT: NORMAL
LAB AP SPECIAL STAINS: NORMAL
LAB AP SYNOPTIC CHECKLIST: NORMAL
PATH REPORT.FINAL DX SPEC: NORMAL
PATH REPORT.GROSS SPEC: NORMAL

## 2020-02-17 RX ORDER — METHYLPREDNISOLONE 4 MG/1
TABLET ORAL
Qty: 21 TABLET | Refills: 0 | Status: SHIPPED | OUTPATIENT
Start: 2020-02-17 | End: 2020-03-03

## 2020-02-17 NOTE — TELEPHONE ENCOUNTER
I called her regarding a rash that she was having.  A Medrol Dosepak was called in.  I also gave her the path report.  She has DCIS and needs to come in for breast cancer consultation.

## 2020-02-17 NOTE — TELEPHONE ENCOUNTER
Surgery last Wednesday. Has a rash under arm. Looks like burn. Around incision has blister type rash. A couple places on back. Broke out Thursday. Stopped pain meds Wednesday night. Thursday night, took another Hydrocodone. Wants something called in. Has a flight leaving at 7:30. Correct pharmacy number is in chart. She stated she may go to an after hours clinic if she doesn't hear from you. Also wanting pathology results. Called path about results not being back yet. I was told they are reprocessing some of the tissue blocks so it is delayed.

## 2020-02-18 DIAGNOSIS — C50.919 MALIGNANT NEOPLASM OF FEMALE BREAST, UNSPECIFIED ESTROGEN RECEPTOR STATUS, UNSPECIFIED LATERALITY, UNSPECIFIED SITE OF BREAST (HCC): Primary | ICD-10-CM

## 2020-02-21 ENCOUNTER — OFFICE VISIT (OUTPATIENT)
Dept: MAMMOGRAPHY | Facility: CLINIC | Age: 55
End: 2020-02-21

## 2020-02-21 VITALS — SYSTOLIC BLOOD PRESSURE: 110 MMHG | DIASTOLIC BLOOD PRESSURE: 70 MMHG

## 2020-02-21 DIAGNOSIS — D05.12 DUCTAL CARCINOMA IN SITU (DCIS) OF LEFT BREAST: Primary | ICD-10-CM

## 2020-02-21 PROCEDURE — 99024 POSTOP FOLLOW-UP VISIT: CPT | Performed by: SURGERY

## 2020-02-21 NOTE — PROGRESS NOTES
Chief Complaint: Rachelle Corbin is a  54 y.o. female, initially referred by No ref. provider found , who is here today for a postoperative visit.    History of Present Illness:  In the interim,Rachelle Corbin has had the following procedure and resultant pathology report: She is undergone a needle localized excisional biopsy.  Unfortunately the path report has returned intermediate grade DCIS measuring 18 mm.  The margins were negative but the closest one was 0.2 mm from the anterior margin.  This tumor was ER positive at 91% and MN positive at 21%.  She did suffer from what looks like contact dermatitis that has fortunately resolved.     Denies fever or chills.      Current Outpatient Medications:   •  acetaminophen (TYLENOL) 500 MG tablet, Take 1,000 mg by mouth Every 6 (Six) Hours As Needed for Mild Pain ., Disp: , Rfl:   •  HYDROcodone-acetaminophen (NORCO) 5-325 MG per tablet, Take 1-2 tablets by mouth Every 4 (Four) Hours As Needed (Pain)., Disp: 8 tablet, Rfl: 0  •  methylPREDNISolone (MEDROL, ANA LILIA,) 4 MG tablet, Take as directed on package instructions., Disp: 21 tablet, Rfl: 0  •  valACYclovir (VALTREX) 1000 MG tablet, Take 1,000 mg by mouth Daily., Disp: , Rfl: 11  Physical examination  Left breast- the incision looks good and shows no signs of infection or drainage.  There is some residual redness to the axillary region but it is much improved according to the patient.  Assessment:  DCIS left breast diagnosed on an excisional biopsy.    Plan:  We had a nice discussion today regarding her situation.  She understands that the 2 options are generally breast conserving surgery consisting of a lumpectomy with clear margins and radiation.  The other option would be a mastectomy with the option for reconstruction immediately or delayed.  We also talked about the close anterior margin.  She was told that we generally strive for 2 mm margins if we can.  If we pursued the breast conserving route, I would generally  recommend re-excising that anterior margin.  The patient had some concerns about the presence of other disease in the breast on the left side or perhaps the right side.  For that reason I have ordered an MRI and I will call her when those results are back.  I told her that if we were to re-excise the anterior margin I would generally like to perform that within 3 weeks of her original surgery.          EMR Dragon/transcription disclaimer:    Much of this encounter note is an electronic transcription/translocation of spoken language to printed text.  The electronic translation of spoken language may permit erroneous, or at times, nonsensical words or phrases to be inadvertently transcribed.  Although I have reviewed the note from such areas, some may still exist.

## 2020-02-25 ENCOUNTER — HOSPITAL ENCOUNTER (OUTPATIENT)
Dept: MRI IMAGING | Facility: HOSPITAL | Age: 55
Discharge: HOME | End: 2020-02-25
Admitting: SURGERY

## 2020-02-25 DIAGNOSIS — D05.12 DUCTAL CARCINOMA IN SITU (DCIS) OF LEFT BREAST: ICD-10-CM

## 2020-02-25 PROCEDURE — A9577 INJ MULTIHANCE: HCPCS | Performed by: SURGERY

## 2020-02-25 PROCEDURE — 77049 MRI BREAST C-+ W/CAD BI: CPT

## 2020-02-25 PROCEDURE — 0 GADOBENATE DIMEGLUMINE 529 MG/ML SOLUTION: Performed by: SURGERY

## 2020-02-25 RX ADMIN — GADOBENATE DIMEGLUMINE 16 ML: 529 INJECTION, SOLUTION INTRAVENOUS at 09:37

## 2020-02-26 ENCOUNTER — TELEPHONE (OUTPATIENT)
Dept: MAMMOGRAPHY | Facility: CLINIC | Age: 55
End: 2020-02-26

## 2020-02-26 ENCOUNTER — PREP FOR SURGERY (OUTPATIENT)
Dept: OTHER | Facility: HOSPITAL | Age: 55
End: 2020-02-26

## 2020-02-26 DIAGNOSIS — D05.12 DUCTAL CARCINOMA IN SITU (DCIS) OF LEFT BREAST: Primary | ICD-10-CM

## 2020-02-26 RX ORDER — CEFAZOLIN SODIUM 2 G/100ML
2 INJECTION, SOLUTION INTRAVENOUS ONCE
Status: CANCELLED | OUTPATIENT
Start: 2020-03-04 | End: 2020-02-26

## 2020-02-26 NOTE — TELEPHONE ENCOUNTER
I told her the MRI did not show any residual suspicious enhancement.  She does have a 7 cm seroma from her surgery.  Patient prefers to have the anterior margin reexcised and I have placed orders for that.

## 2020-02-28 ENCOUNTER — TELEPHONE (OUTPATIENT)
Dept: OTHER | Facility: HOSPITAL | Age: 55
End: 2020-02-28

## 2020-02-28 NOTE — TELEPHONE ENCOUNTER
Referral received from Dr. Eduardo's office. I called Ms. Corbin and introduced myself and navigational services. She states the plan is to have a lumpectomy revision on March 4th. She has a good understanding of her pathology and treatment options and  is comfortable with her plan of care.     We dicussed her support system and she stated she has a few good friends she reached out to that are very supportive.  We discussed that we have counseling services available through our [facility]. She declined the need for that at present.    We discussed integrative therapies and other services at the [facility]. She verbalized interest in receiving a navigation folder outlining services. I verified her mailing address and will send out a navigation folder with the following information:     [facility],  Sharing Our Stories Breast Cancer Support Group, Cancer and Restorative Exercise (CARE), [employer] Exercise program, Together for Breast Cancer Survival,  For Women Facing Breast Cancer, Road to Recovery, Bio[employer], [facility] [place], Massage Therapy, Reiki Therapy, [employer], Cancer Nutrition, [facility], [facility], and Survivorship Clinic.     She verbalized appreciation for navigational services and she has my contact information and will call with any questions that arise.

## 2020-02-28 NOTE — TELEPHONE ENCOUNTER
Referral received from Dr. Eduardo office. Called Ms. Corbin and left a message introducing myself and navigational services. Asked her to call me back at her convenience and left my contact information.

## 2020-03-02 ENCOUNTER — TELEPHONE (OUTPATIENT)
Dept: MAMMOGRAPHY | Facility: CLINIC | Age: 55
End: 2020-03-02

## 2020-03-04 ENCOUNTER — ANESTHESIA (OUTPATIENT)
Dept: PERIOP | Facility: HOSPITAL | Age: 55
End: 2020-03-04

## 2020-03-04 ENCOUNTER — ANESTHESIA EVENT (OUTPATIENT)
Dept: PERIOP | Facility: HOSPITAL | Age: 55
End: 2020-03-04

## 2020-03-04 ENCOUNTER — HOSPITAL ENCOUNTER (OUTPATIENT)
Facility: HOSPITAL | Age: 55
Setting detail: HOSPITAL OUTPATIENT SURGERY
Discharge: HOME | End: 2020-03-04
Attending: SURGERY | Admitting: SURGERY

## 2020-03-04 VITALS
WEIGHT: 179.01 LBS | HEART RATE: 71 BPM | HEIGHT: 68 IN | TEMPERATURE: 97.8 F | OXYGEN SATURATION: 93 % | RESPIRATION RATE: 12 BRPM | DIASTOLIC BLOOD PRESSURE: 98 MMHG | SYSTOLIC BLOOD PRESSURE: 143 MMHG | BODY MASS INDEX: 27.13 KG/M2

## 2020-03-04 DIAGNOSIS — D05.12 DUCTAL CARCINOMA IN SITU (DCIS) OF LEFT BREAST: Primary | ICD-10-CM

## 2020-03-04 DIAGNOSIS — D05.12 DUCTAL CARCINOMA IN SITU (DCIS) OF LEFT BREAST: ICD-10-CM

## 2020-03-04 PROCEDURE — 25010000002 FENTANYL CITRATE (PF) 100 MCG/2ML SOLUTION: Performed by: NURSE ANESTHETIST, CERTIFIED REGISTERED

## 2020-03-04 PROCEDURE — 25010000002 KETOROLAC TROMETHAMINE PER 15 MG: Performed by: NURSE ANESTHETIST, CERTIFIED REGISTERED

## 2020-03-04 PROCEDURE — 25010000002 ONDANSETRON PER 1 MG: Performed by: NURSE ANESTHETIST, CERTIFIED REGISTERED

## 2020-03-04 PROCEDURE — 88307 TISSUE EXAM BY PATHOLOGIST: CPT | Performed by: SURGERY

## 2020-03-04 PROCEDURE — 25010000003 CEFAZOLIN IN DEXTROSE 2-4 GM/100ML-% SOLUTION: Performed by: SURGERY

## 2020-03-04 PROCEDURE — 19301 PARTIAL MASTECTOMY: CPT | Performed by: SURGERY

## 2020-03-04 PROCEDURE — 25010000002 DEXAMETHASONE PER 1 MG: Performed by: NURSE ANESTHETIST, CERTIFIED REGISTERED

## 2020-03-04 PROCEDURE — 25010000002 PROPOFOL 10 MG/ML EMULSION: Performed by: NURSE ANESTHETIST, CERTIFIED REGISTERED

## 2020-03-04 RX ORDER — DEXAMETHASONE SODIUM PHOSPHATE 10 MG/ML
INJECTION INTRAMUSCULAR; INTRAVENOUS AS NEEDED
Status: DISCONTINUED | OUTPATIENT
Start: 2020-03-04 | End: 2020-03-04 | Stop reason: SURG

## 2020-03-04 RX ORDER — FENTANYL CITRATE 50 UG/ML
50 INJECTION, SOLUTION INTRAMUSCULAR; INTRAVENOUS
Status: DISCONTINUED | OUTPATIENT
Start: 2020-03-04 | End: 2020-03-04 | Stop reason: HOSPADM

## 2020-03-04 RX ORDER — ONDANSETRON 2 MG/ML
4 INJECTION INTRAMUSCULAR; INTRAVENOUS ONCE AS NEEDED
Status: DISCONTINUED | OUTPATIENT
Start: 2020-03-04 | End: 2020-03-04 | Stop reason: HOSPADM

## 2020-03-04 RX ORDER — HYDROCODONE BITARTRATE AND ACETAMINOPHEN 5; 325 MG/1; MG/1
1-2 TABLET ORAL EVERY 4 HOURS PRN
Qty: 6 TABLET | Refills: 0 | Status: SHIPPED | OUTPATIENT
Start: 2020-03-04 | End: 2020-12-14

## 2020-03-04 RX ORDER — FENTANYL CITRATE 50 UG/ML
INJECTION, SOLUTION INTRAMUSCULAR; INTRAVENOUS AS NEEDED
Status: DISCONTINUED | OUTPATIENT
Start: 2020-03-04 | End: 2020-03-04 | Stop reason: SURG

## 2020-03-04 RX ORDER — MIDAZOLAM HYDROCHLORIDE 1 MG/ML
1 INJECTION INTRAMUSCULAR; INTRAVENOUS
Status: DISCONTINUED | OUTPATIENT
Start: 2020-03-04 | End: 2020-03-04 | Stop reason: HOSPADM

## 2020-03-04 RX ORDER — MIDAZOLAM HYDROCHLORIDE 1 MG/ML
2 INJECTION INTRAMUSCULAR; INTRAVENOUS
Status: DISCONTINUED | OUTPATIENT
Start: 2020-03-04 | End: 2020-03-04 | Stop reason: HOSPADM

## 2020-03-04 RX ORDER — SODIUM CHLORIDE 0.9 % (FLUSH) 0.9 %
10 SYRINGE (ML) INJECTION AS NEEDED
Status: DISCONTINUED | OUTPATIENT
Start: 2020-03-04 | End: 2020-03-04 | Stop reason: HOSPADM

## 2020-03-04 RX ORDER — ACETAMINOPHEN 325 MG/1
650 TABLET ORAL ONCE AS NEEDED
Status: DISCONTINUED | OUTPATIENT
Start: 2020-03-04 | End: 2020-03-04 | Stop reason: HOSPADM

## 2020-03-04 RX ORDER — FLUMAZENIL 0.1 MG/ML
0.2 INJECTION INTRAVENOUS AS NEEDED
Status: DISCONTINUED | OUTPATIENT
Start: 2020-03-04 | End: 2020-03-04 | Stop reason: HOSPADM

## 2020-03-04 RX ORDER — NALOXONE HCL 0.4 MG/ML
0.2 VIAL (ML) INJECTION AS NEEDED
Status: DISCONTINUED | OUTPATIENT
Start: 2020-03-04 | End: 2020-03-04 | Stop reason: HOSPADM

## 2020-03-04 RX ORDER — FAMOTIDINE 10 MG/ML
20 INJECTION, SOLUTION INTRAVENOUS ONCE
Status: COMPLETED | OUTPATIENT
Start: 2020-03-04 | End: 2020-03-04

## 2020-03-04 RX ORDER — CEFAZOLIN SODIUM 2 G/100ML
2 INJECTION, SOLUTION INTRAVENOUS ONCE
Status: COMPLETED | OUTPATIENT
Start: 2020-03-04 | End: 2020-03-04

## 2020-03-04 RX ORDER — DIPHENHYDRAMINE HYDROCHLORIDE 50 MG/ML
12.5 INJECTION INTRAMUSCULAR; INTRAVENOUS
Status: DISCONTINUED | OUTPATIENT
Start: 2020-03-04 | End: 2020-03-04 | Stop reason: HOSPADM

## 2020-03-04 RX ORDER — HYDROCODONE BITARTRATE AND ACETAMINOPHEN 7.5; 325 MG/1; MG/1
1 TABLET ORAL ONCE AS NEEDED
Status: COMPLETED | OUTPATIENT
Start: 2020-03-04 | End: 2020-03-04

## 2020-03-04 RX ORDER — LABETALOL HYDROCHLORIDE 5 MG/ML
5 INJECTION, SOLUTION INTRAVENOUS
Status: DISCONTINUED | OUTPATIENT
Start: 2020-03-04 | End: 2020-03-04 | Stop reason: HOSPADM

## 2020-03-04 RX ORDER — PROMETHAZINE HYDROCHLORIDE 25 MG/ML
12.5 INJECTION, SOLUTION INTRAMUSCULAR; INTRAVENOUS ONCE AS NEEDED
Status: DISCONTINUED | OUTPATIENT
Start: 2020-03-04 | End: 2020-03-04 | Stop reason: HOSPADM

## 2020-03-04 RX ORDER — DIPHENHYDRAMINE HCL 25 MG
25 CAPSULE ORAL
Status: DISCONTINUED | OUTPATIENT
Start: 2020-03-04 | End: 2020-03-04 | Stop reason: HOSPADM

## 2020-03-04 RX ORDER — PROMETHAZINE HYDROCHLORIDE 25 MG/ML
6.25 INJECTION, SOLUTION INTRAMUSCULAR; INTRAVENOUS
Status: DISCONTINUED | OUTPATIENT
Start: 2020-03-04 | End: 2020-03-04 | Stop reason: HOSPADM

## 2020-03-04 RX ORDER — OXYCODONE AND ACETAMINOPHEN 7.5; 325 MG/1; MG/1
1 TABLET ORAL ONCE AS NEEDED
Status: DISCONTINUED | OUTPATIENT
Start: 2020-03-04 | End: 2020-03-04 | Stop reason: HOSPADM

## 2020-03-04 RX ORDER — ONDANSETRON 2 MG/ML
INJECTION INTRAMUSCULAR; INTRAVENOUS AS NEEDED
Status: DISCONTINUED | OUTPATIENT
Start: 2020-03-04 | End: 2020-03-04 | Stop reason: SURG

## 2020-03-04 RX ORDER — KETOROLAC TROMETHAMINE 30 MG/ML
INJECTION, SOLUTION INTRAMUSCULAR; INTRAVENOUS AS NEEDED
Status: DISCONTINUED | OUTPATIENT
Start: 2020-03-04 | End: 2020-03-04 | Stop reason: SURG

## 2020-03-04 RX ORDER — PROMETHAZINE HYDROCHLORIDE 25 MG/1
25 TABLET ORAL ONCE AS NEEDED
Status: DISCONTINUED | OUTPATIENT
Start: 2020-03-04 | End: 2020-03-04 | Stop reason: HOSPADM

## 2020-03-04 RX ORDER — SODIUM CHLORIDE 0.9 % (FLUSH) 0.9 %
10 SYRINGE (ML) INJECTION EVERY 12 HOURS SCHEDULED
Status: DISCONTINUED | OUTPATIENT
Start: 2020-03-04 | End: 2020-03-04 | Stop reason: HOSPADM

## 2020-03-04 RX ORDER — PROPOFOL 10 MG/ML
VIAL (ML) INTRAVENOUS AS NEEDED
Status: DISCONTINUED | OUTPATIENT
Start: 2020-03-04 | End: 2020-03-04 | Stop reason: SURG

## 2020-03-04 RX ORDER — ACETAMINOPHEN, ASPIRIN AND CAFFEINE 250; 250; 65 MG/1; MG/1; MG/1
2 TABLET, FILM COATED ORAL ONCE AS NEEDED
Status: COMPLETED | OUTPATIENT
Start: 2020-03-04 | End: 2020-03-04

## 2020-03-04 RX ORDER — MAGNESIUM HYDROXIDE 1200 MG/15ML
LIQUID ORAL AS NEEDED
Status: DISCONTINUED | OUTPATIENT
Start: 2020-03-04 | End: 2020-03-04 | Stop reason: HOSPADM

## 2020-03-04 RX ORDER — HYDRALAZINE HYDROCHLORIDE 20 MG/ML
5 INJECTION INTRAMUSCULAR; INTRAVENOUS
Status: DISCONTINUED | OUTPATIENT
Start: 2020-03-04 | End: 2020-03-04 | Stop reason: HOSPADM

## 2020-03-04 RX ORDER — EPHEDRINE SULFATE 50 MG/ML
5 INJECTION, SOLUTION INTRAVENOUS ONCE AS NEEDED
Status: DISCONTINUED | OUTPATIENT
Start: 2020-03-04 | End: 2020-03-04 | Stop reason: HOSPADM

## 2020-03-04 RX ORDER — BUPIVACAINE HYDROCHLORIDE 2.5 MG/ML
INJECTION, SOLUTION INFILTRATION; PERINEURAL AS NEEDED
Status: DISCONTINUED | OUTPATIENT
Start: 2020-03-04 | End: 2020-03-04 | Stop reason: HOSPADM

## 2020-03-04 RX ORDER — HYDROMORPHONE HYDROCHLORIDE 1 MG/ML
0.5 INJECTION, SOLUTION INTRAMUSCULAR; INTRAVENOUS; SUBCUTANEOUS
Status: DISCONTINUED | OUTPATIENT
Start: 2020-03-04 | End: 2020-03-04 | Stop reason: HOSPADM

## 2020-03-04 RX ORDER — SODIUM CHLORIDE, SODIUM LACTATE, POTASSIUM CHLORIDE, CALCIUM CHLORIDE 600; 310; 30; 20 MG/100ML; MG/100ML; MG/100ML; MG/100ML
9 INJECTION, SOLUTION INTRAVENOUS CONTINUOUS
Status: DISCONTINUED | OUTPATIENT
Start: 2020-03-04 | End: 2020-03-04 | Stop reason: HOSPADM

## 2020-03-04 RX ORDER — LIDOCAINE HYDROCHLORIDE 20 MG/ML
INJECTION, SOLUTION INFILTRATION; PERINEURAL AS NEEDED
Status: DISCONTINUED | OUTPATIENT
Start: 2020-03-04 | End: 2020-03-04 | Stop reason: SURG

## 2020-03-04 RX ORDER — PROMETHAZINE HYDROCHLORIDE 25 MG/1
25 SUPPOSITORY RECTAL ONCE AS NEEDED
Status: DISCONTINUED | OUTPATIENT
Start: 2020-03-04 | End: 2020-03-04 | Stop reason: HOSPADM

## 2020-03-04 RX ADMIN — CEFAZOLIN SODIUM 2 G: 2 INJECTION, SOLUTION INTRAVENOUS at 15:34

## 2020-03-04 RX ADMIN — FENTANYL CITRATE 50 MCG: 50 INJECTION INTRAMUSCULAR; INTRAVENOUS at 17:12

## 2020-03-04 RX ADMIN — PROPOFOL 200 MG: 10 INJECTION, EMULSION INTRAVENOUS at 15:25

## 2020-03-04 RX ADMIN — ONDANSETRON HYDROCHLORIDE 4 MG: 2 SOLUTION INTRAMUSCULAR; INTRAVENOUS at 16:06

## 2020-03-04 RX ADMIN — SODIUM CHLORIDE, POTASSIUM CHLORIDE, SODIUM LACTATE AND CALCIUM CHLORIDE 9 ML/HR: 600; 310; 30; 20 INJECTION, SOLUTION INTRAVENOUS at 17:33

## 2020-03-04 RX ADMIN — ACETAMINOPHEN, ASPIRIN AND CAFFEINE 2 TABLET: 250; 250; 65 TABLET, FILM COATED ORAL at 18:05

## 2020-03-04 RX ADMIN — KETOROLAC TROMETHAMINE 30 MG: 30 INJECTION, SOLUTION INTRAMUSCULAR; INTRAVENOUS at 16:19

## 2020-03-04 RX ADMIN — PROPOFOL 150 MG: 10 INJECTION, EMULSION INTRAVENOUS at 15:26

## 2020-03-04 RX ADMIN — FENTANYL CITRATE 50 MCG: 50 INJECTION INTRAMUSCULAR; INTRAVENOUS at 15:47

## 2020-03-04 RX ADMIN — SODIUM CHLORIDE, POTASSIUM CHLORIDE, SODIUM LACTATE AND CALCIUM CHLORIDE 9 ML/HR: 600; 310; 30; 20 INJECTION, SOLUTION INTRAVENOUS at 13:58

## 2020-03-04 RX ADMIN — LIDOCAINE HYDROCHLORIDE 100 MG: 20 INJECTION, SOLUTION INFILTRATION; PERINEURAL at 15:25

## 2020-03-04 RX ADMIN — FAMOTIDINE 20 MG: 10 INJECTION INTRAVENOUS at 13:58

## 2020-03-04 RX ADMIN — DEXAMETHASONE SODIUM PHOSPHATE 8 MG: 10 INJECTION INTRAMUSCULAR; INTRAVENOUS at 15:36

## 2020-03-04 RX ADMIN — FENTANYL CITRATE 50 MCG: 50 INJECTION INTRAMUSCULAR; INTRAVENOUS at 17:29

## 2020-03-04 RX ADMIN — HYDROCODONE BITARTRATE AND ACETAMINOPHEN 1 TABLET: 7.5; 325 TABLET ORAL at 17:14

## 2020-03-04 RX ADMIN — FENTANYL CITRATE 50 MCG: 50 INJECTION INTRAMUSCULAR; INTRAVENOUS at 15:25

## 2020-03-04 NOTE — INTERVAL H&P NOTE
H&P updated. The patient was examined and the following changes are noted:  The MRI did not reveal any suspicious enhancement around the biopsy cavity or anywhere else in the breast.  The patient prefers reexcision of the anterior margin

## 2020-03-04 NOTE — OP NOTE
Operative note    Preoperative Diagnosis: Left breast cancer with close anterior margin following lumpectomy    Postoperative Diagnosis: Same    Procedure Performed: Reexcision anterior margin left breast lumpectomy cavity    Indications-the patient is a 54-year-old white female with a recent needle localized excisional biopsy revealing some unsuspected DCIS.  All the margins were good but the anterior margin was close at 0.2 mm.  She is here for reexcision of that margin.    Dictating physician and surgeon: Harshad Eduardo MD      EBL: Less than 5 cc    FINDINGS AND DESCRIPTION OF PROCEDURE:     The patient was taken to the operating room and placed on the operating table in the supine position and given adequate general anesthesia. The left breast was then prepped and draped in sterile fashion using Betadine.  The involved area was anesthetized with   quarter percent Marcaine plain.  A total of 20 cc was used.  A 15 blade knife was used to open up the old incision.  The old sutures were divided and the fluid from the lumpectomy cavity was aspirated.  The anterior margin was then easily identified and we took a shave margin from the superior and inferior sides of this anterior margin.  The specimen was then placed in formalin and sent to pathology..          The wound was irrigated and hemostasis obtained using electrocautery unit.  The incision was then closed in layers using 3-0 Vicryl suture for the subcutaneous layer and a running 4-0 Vicryl subcuticular stitch for the skin.   Steri-Strips and a Tegaderm dressing were applied.     Sponge and needle counts were correct and the patient was taken to the recovery area in stable condition.    Specimen- breast tissue from anterior margin lumpectomy cavity left breast

## 2020-03-04 NOTE — ANESTHESIA PREPROCEDURE EVALUATION
Anesthesia Evaluation     Patient summary reviewed and Nursing notes reviewed   history of anesthetic complications: prolonged sedation  NPO Solid Status: > 8 hours  NPO Liquid Status: > 2 hours           Airway   Mallampati: II  Dental - normal exam     Pulmonary - normal exam   (+) a smoker Former,   Cardiovascular - negative cardio ROS    Rhythm: regular  Rate: normal        Neuro/Psych- negative ROS  GI/Hepatic/Renal/Endo    (+)   renal disease stones,     Musculoskeletal (-) negative ROS    Abdominal    Substance History - negative use     OB/GYN negative ob/gyn ROS         Other      history of cancer                      Anesthesia Plan    ASA 2     general     intravenous induction     Anesthetic plan, all risks, benefits, and alternatives have been provided, discussed and informed consent has been obtained with: patient.

## 2020-03-04 NOTE — ANESTHESIA PROCEDURE NOTES
Airway  Urgency: elective    Date/Time: 3/4/2020 3:27 PM  Airway not difficult    General Information and Staff    Patient location during procedure: OR  CRNA: Cyril Fernandes CRNA    Indications and Patient Condition    Preoxygenated: yes      Final Airway Details  Final airway type: supraglottic airway      Successful airway: classic  Size 4    Assessment: lips, teeth, and gum same as pre-op and atraumatic intubation

## 2020-03-06 LAB
CYTO UR: NORMAL
LAB AP CASE REPORT: NORMAL
PATH REPORT.FINAL DX SPEC: NORMAL
PATH REPORT.GROSS SPEC: NORMAL

## 2020-03-06 NOTE — ANESTHESIA POSTPROCEDURE EVALUATION
"Patient: Rachelle Corbin    Procedure Summary     Date:  03/04/20 Room / Location:  [facility] OR  / [facility] MAIN OR    Anesthesia Start:  1524 Anesthesia Stop:  1634    Procedure:  RE EXCISION ANTERIOR MARGIN OF LEFT BREAST LUMPECTOMY CAVITY (Left Breast) Diagnosis:       Ductal carcinoma in situ (DCIS) of left breast      (Ductal carcinoma in situ (DCIS) of left breast [D05.12])    Surgeon:  Harshad Eduardo MD Provider:  Leon Stevenson MD    Anesthesia Type:  general ASA Status:  2          Anesthesia Type: general    Vitals  Vitals Value Taken Time   /98 3/4/2020  6:00 PM   Temp 36.6 °C (97.8 °F) 3/4/2020  6:00 PM   Pulse 96 3/4/2020  6:06 PM   Resp 12 3/4/2020  6:00 PM   SpO2 98 % 3/4/2020  6:13 PM   Vitals shown include unvalidated device data.        Post Anesthesia Care and Evaluation      Comments: Patient discharged before being evaluated by an Anesthesiologist. No apparent complications per the record.  This case was not medically directed. I am completing this chart for medical records purposes; I personally have no medical involvement with this patient.    /98   Pulse 71   Temp 36.6 °C (97.8 °F) (Oral)   Resp 12   Ht 171.5 cm (67.5\")   Wt 81.2 kg (179 lb 0.2 oz)   SpO2 93%   BMI 27.62 kg/m²           "

## 2020-03-12 ENCOUNTER — TELEPHONE (OUTPATIENT)
Dept: MAMMOGRAPHY | Facility: CLINIC | Age: 55
End: 2020-03-12

## 2020-03-23 ENCOUNTER — OFFICE VISIT (OUTPATIENT)
Dept: MAMMOGRAPHY | Facility: CLINIC | Age: 55
End: 2020-03-23

## 2020-03-23 DIAGNOSIS — D05.12 DUCTAL CARCINOMA IN SITU (DCIS) OF LEFT BREAST: Primary | ICD-10-CM

## 2020-03-23 PROCEDURE — 99024 POSTOP FOLLOW-UP VISIT: CPT | Performed by: SURGERY

## 2020-04-02 ENCOUNTER — APPOINTMENT (OUTPATIENT)
Dept: LAB | Facility: HOSPITAL | Age: 55
End: 2020-04-02

## 2020-04-03 ENCOUNTER — TELEMEDICINE (OUTPATIENT)
Dept: ONCOLOGY | Facility: CLINIC | Age: 55
End: 2020-04-03

## 2020-04-03 DIAGNOSIS — D05.12 DUCTAL CARCINOMA IN SITU (DCIS) OF LEFT BREAST: Primary | ICD-10-CM

## 2020-04-03 PROCEDURE — 99244 OFF/OP CNSLTJ NEW/EST MOD 40: CPT | Performed by: INTERNAL MEDICINE

## 2020-04-03 RX ORDER — TAMOXIFEN CITRATE 20 MG/1
20 TABLET ORAL DAILY
Qty: 30 TABLET | Refills: 1 | Status: SHIPPED | OUTPATIENT
Start: 2020-04-03 | End: 2020-05-01

## 2020-04-03 NOTE — PROGRESS NOTES
Subjective     REASON FOR CONSULTATION:  DCIS of the left breast  Provide an opinion on any further workup or treatment                             REQUESTING PHYSICIAN:  Dr. Eduardo    RECORDS OBTAINED:  Records of the patients history including those obtained from the referring provider were reviewed and summarized in detail.      History of Present Illness   This is a pleasant 54-year-old woman seen today by video visit for recent diagnosis of ductal carcinoma in situ of the left breast.  The patient had an abnormal screening mammography performed 11/22/2019 showing abnormal calcifications in the upper outer quadrant of the left breast.  A stereotactic guided left biopsy was attempted but unable to be performed secondary to the location and faint character of the calcifications in the posterior one third upper outer quadrant of the left breast.  She was referred to surgery and underwent a needle localization excisional biopsy of the left breast on 2/12/2020 which showed intermediate grade ductal carcinoma in situ with solid and cribriform types, associated necrosis and calcifications.  The DCIS spanned 18 mm.  Margins were negative closest distance 0.2 mm from the anterior margin.  Separate focus of atypical ductal hyperplasia with calcifications also noted.  MRI of the breasts on 2/25/2020 showed a large postsurgical seroma in the upper outer quadrant of the left breast but no suspicious residual enhancement at this location or elsewhere in either breast.  She was taken back to the operating room 3/4/2020 for excision of margins with negative pathology for atypia, in situ or invasive disease.    The patient has no prior history of breast cancer.  She has no familial history of breast or ovarian cancers.  The patient has had a previous hysterectomy for treatment of menorrhagia; she still has ovaries in place.  She is unsure if she is postmenopausal and states her GYN lisa labs in November 2019.  The patient  "has no prior history of venous thromboembolism.  She has never had a bone density but her mother had severe osteoporosis.    Past Medical History:   Diagnosis Date   • Anesthesia complication     \"I JUST CAN'T COME OUT OF IT.\"   • Breast cancer, left (CMS/[facility]) 02/2020   • Cancer (CMS/[facility])     SKIN CANCER-FOREHEAD SQUAMOUS CELL    • Environmental and seasonal allergies    • Herpes    • History of iron deficiency anemia 2016    Menorrhagia prior to hysterectomy   • Kidney stones 2016   • Mammogram abnormal     LEFT   • Stress incontinence         Past Surgical History:   Procedure Laterality Date   • BREAST BIOPSY Right     Benign   • BREAST BIOPSY Left 2/12/2020    Procedure: BREAST BIOPSY WITH NEEDLE LOCALIZATION;  Surgeon: Hasrhad Eduardo MD;  Location: [facility];  Service: General;  Laterality: Left;   • BREAST BIOPSY Left 3/4/2020    Procedure: RE EXCISION ANTERIOR MARGIN OF LEFT BREAST LUMPECTOMY CAVITY;  Surgeon: Harshad Eduardo MD;  Location: [facility] OR;  Service: General;  Laterality: Left;   • BREAST LUMPECTOMY Right     Benign   • ENDOMETRIAL ABLATION  12/01/2010   • KIDNEY STONE SURGERY  2001   • LACRIMAL DUCT PROBING W/ STENT PLACEMENT Right    • TONSILLECTOMY AND ADENOIDECTOMY     • VAGINAL HYSTERECTOMY  2016        Current Outpatient Medications on File Prior to Visit   Medication Sig Dispense Refill   • acetaminophen (TYLENOL) 500 MG tablet Take 1,000 mg by mouth Every 6 (Six) Hours As Needed for Mild Pain .     • HYDROcodone-acetaminophen (NORCO) 5-325 MG per tablet Take 1-2 tablets by mouth Every 4 (Four) Hours As Needed (Pain). 8 tablet 0   • HYDROcodone-acetaminophen (NORCO) 5-325 MG per tablet Take 1-2 tablets by mouth Every 4 (Four) Hours As Needed (Pain). 6 tablet 0   • valACYclovir (VALTREX) 1000 MG tablet Take 1,000 mg by mouth Daily.  11     No current facility-administered medications on file prior to visit.         ALLERGIES:    Allergies   Allergen Reactions   • " Adhesive Tape Hives and Rash     tegaderm  ekg leads        Social History     Socioeconomic History   • Marital status:      Spouse name: Wayne   • Number of children: 1   • Years of education: Not on file   • Highest education level: Not on file   Occupational History     Employer: SELF-EMPLOYED   Tobacco Use   • Smoking status: Former Smoker     Packs/day: 1.00     Years: 3.00     Pack years: 3.00     Types: Cigarettes     Last attempt to quit:      Years since quittin.2   • Smokeless tobacco: Never Used   Substance and Sexual Activity   • Alcohol use: Yes     Alcohol/week: 3.0 standard drinks     Types: 1 Glasses of wine, 1 Cans of beer, 1 Shots of liquor per week   • Drug use: No   • Sexual activity: Defer        Family History   Problem Relation Age of Onset   • Osteoporosis Mother    • Coronary artery disease Father 48         at 54   • Heart disease Father    • Heart attack Father    • Coronary artery disease Maternal Grandmother         fatal MI at 70   • Osteoporosis Maternal Grandmother    • Coronary artery disease Paternal Grandfather    • Thyroid cancer Sister    • Thyroid disease Sister    • Lung cancer Paternal Uncle    • Other Other         Potter syndrome   • Colon cancer Other    • Diabetes Other    • Breast cancer Neg Hx    • Hypotension Neg Hx    • Hypertension Neg Hx    • Malig Hyperthermia Neg Hx         Review of Systems   Constitutional: Negative.    HENT: Negative.    Respiratory: Negative.    Cardiovascular: Negative.    Gastrointestinal: Negative.    Genitourinary: Negative.    Musculoskeletal: Negative.    Skin: Negative.    Allergic/Immunologic: Negative.    Neurological: Negative.    Hematological: Negative.    Psychiatric/Behavioral: Negative.           Objective     There were no vitals filed for this visit.  No flowsheet data found.    Physical Exam    Gen: well appearing woman, no acute distress  HEENT: normal hearing, normal EOM  RESP: normal respiratory  effort  SKIN: no obvious rash or induration  NEURO: alert and oriented x3, strength appears normal  PSYCH: normal mood and affect, normal insight/judgement      RECENT LABS:  Hematology WBC   Date Value Ref Range Status   02/05/2020 3.89 3.40 - 10.80 10*3/mm3 Final   06/12/2018 5.98 4.50 - 10.70 10*3/mm3 Final     RBC   Date Value Ref Range Status   02/05/2020 4.55 3.77 - 5.28 10*6/mm3 Final   06/12/2018 4.61 3.90 - 5.20 10*6/mm3 Final     Hemoglobin   Date Value Ref Range Status   02/05/2020 14.2 12.0 - 15.9 g/dL Final     Hematocrit   Date Value Ref Range Status   02/05/2020 41.2 34.0 - 46.6 % Final     Platelets   Date Value Ref Range Status   02/05/2020 241 140 - 450 10*3/mm3 Final      Imaging/path per HPI    Assessment/Plan   1.  Ductal carcinoma in situ of the left breast, intermediate grade with necrosis and calcifications, ER +91 200% of cells, MN +21 to 30% of cells status post lumpectomy with negative margins  2.  Atypical ductal hyperplasia of the breast    Reviewed the pathologic results from her breast surgery with the patient and her  by video visit today.  25 minutes were spent face-to-face with the patient by video visit.    I discussed the role of antiestrogen therapy in the adjuvant treatment for ductal carcinoma in situ of the breast.  I explained that antiestrogen therapy can reduce the risk of recurrence of DCIS.  I explained that the fact she has DCIS and atypical ductal hyperplasia of the breast also increases her lifetime risk of developing subsequent breast cancers.  I explained the role of antiestrogen therapy in reducing lifetime risk of breast cancer in general.  I discussed with her the typical side effects and risk of tamoxifen and also aromatase inhibitors.  These include but not limited to with respect to tamoxifen vasomotor symptoms, venous thromboses, endometrial malignancy, alopecia, possible other cardiovascular risks.  With respect to aromatase inhibitors there is less  incidence of endometrial malignancies and venous thrombosis but added risk of bone loss.  The patient is status post hysterectomy but still has ovaries in place.  She is unsure if she is postmenopausal.  She states her gynecologist may have drawn labs in November to test her menopausal status and I have requested results.  The patient has never had a bone density but her mother had severe osteoporosis.  Under the clinical circumstances I recommended adjuvant therapy with tamoxifen 20 mg daily for risk reduction of recurrence DCIS and risk reduction of new breast cancers.  We discussed data showing if she does not tolerate full dose, lower doses of tamoxifen may also be beneficial.  She has a meeting with radiation oncology Monday.  If she requires breast radiation she states she would like to wait until after radiation to start tamoxifen which is fine.  I asked the patient to make an appointment with me 4 to 6 weeks after starting tamoxifen so that I can follow-up and assess her side effects.    Thank you for allowing me to participate in the care of this nice patient.

## 2020-04-06 ENCOUNTER — TELEMEDICINE (OUTPATIENT)
Dept: RADIATION ONCOLOGY | Facility: HOSPITAL | Age: 55
End: 2020-04-06

## 2020-04-06 DIAGNOSIS — D05.12 DUCTAL CARCINOMA IN SITU (DCIS) OF LEFT BREAST: Primary | ICD-10-CM

## 2020-04-06 PROCEDURE — 99243 OFF/OP CNSLTJ NEW/EST LOW 30: CPT | Performed by: RADIOLOGY

## 2020-04-06 PROCEDURE — 77263 THER RADIOLOGY TX PLNG CPLX: CPT | Performed by: RADIOLOGY

## 2020-04-06 NOTE — PROGRESS NOTES
DIAGNOSIS and REASON FOR CONSULTATION: DCIS of the left breast - Stage 0 (Tis NX M0) - for advice and recommendations regarding the diagnosis    CHIEF COMPLAINT:  For advice and recommendations regarding Ductal carcinoma in situ (DCIS) of left breast  HISTORY OF PRESENT ILLNESS:  The patient is a 54 y.o. year old female who was found to have an abnormality on routine screening mammogram dated November 12, 2019.  This revealed an area of calcifications in the left breast and diagnostic mammogram on November 22, 2019 confirmed subsequent suspicious calcifications in the upper outer quadrant of the left breast.  She attempted a stereotactic guided biopsy of this area on December 18, 2019 which was unable to be performed due to location and faint character of the calcifications.      Given that, excisional biopsy was recommended and she went to surgery on February 12, 2020 for a left sided needle localized excisional biopsy.  The pathology revealed an intermediate grade ductal carcinoma in situ, solid and cribriform subtypes with necrosis measuring 1.8 cm in greatest dimension.  The margins were all negative but the closest was 0.2 mm from the anterior margin.  The tissue was found to be positive for estrogen receptors at 91 to 100%, positive for progesterone receptors at 21 to 30%.    Given concern regarding the margin, she underwent a bilateral breast MRI on February 25, 2020 which showed the postsurgical seroma at the 230 o'clock position of the left breast but no other abnormality was appreciated in either the left or right breast.  Given these negative findings, she went back to surgery on March 4, 2020 and underwent reexcision of the anterior margin.  That pathology showed no further evidence of residual intraductal or invasive disease.    She has done well postoperatively.  She has seen the [facility] physicians who discussed the possibility of antiestrogen therapy and she is considering the use of tamoxifen.  I was  asked to see the patient at the request of the referring provider noted above for advice and recommendations regarding this diagnosis.     Clinically, she is doing very well. She works from home and has no new complaints on review of systems. She is seen today via video visit and is accompanied by her .    Performance Status: (1) Restricted in physically strenuous activity, ambulatory and able to do work of light nature  Objective   Past Medical History: she  has a past medical history of Anesthesia complication, Breast cancer, left (CMS/HCC) (02/2020), Cancer (CMS/[facility]), Environmental and seasonal allergies, Herpes, History of iron deficiency anemia (2016), Kidney stones (2016), Mammogram abnormal, and Stress incontinence.    Past Surgical History:  she has a past surgical history that includes Vaginal hysterectomy (2016); Lacrimal duct probing w/ stent placement (Right); Breast lumpectomy (Right); Breast biopsy (Right); Breast biopsy (Left, 2/12/2020); Breast biopsy (Left, 3/4/2020); Kidney stone surgery (2001); Tonsillectomy and adenoidectomy; and Endometrial ablation (12/01/2010).    Meds:    Current Outpatient Medications:   •  acetaminophen (TYLENOL) 500 MG tablet, Take 1,000 mg by mouth Every 6 (Six) Hours As Needed for Mild Pain ., Disp: , Rfl:   •  HYDROcodone-acetaminophen (NORCO) 5-325 MG per tablet, Take 1-2 tablets by mouth Every 4 (Four) Hours As Needed (Pain)., Disp: 8 tablet, Rfl: 0  •  HYDROcodone-acetaminophen (NORCO) 5-325 MG per tablet, Take 1-2 tablets by mouth Every 4 (Four) Hours As Needed (Pain)., Disp: 6 tablet, Rfl: 0  •  tamoxifen (NOLVADEX) 20 MG chemo tablet, Take 1 tablet by mouth Daily for 30 days., Disp: 30 tablet, Rfl: 1  •  valACYclovir (VALTREX) 1000 MG tablet, Take 1,000 mg by mouth Daily., Disp: , Rfl: 11    Allergies:    Allergies   Allergen Reactions   • Adhesive Tape Hives and Rash     tegaderm  ekg leads       Family History:  her family history includes Colon cancer in  an other family member; Coronary artery disease in her maternal grandmother and paternal grandfather; Coronary artery disease (age of onset: 48) in her father; Diabetes in an other family member; Heart attack in her father; Heart disease in her father; Lung cancer in her paternal uncle; Osteoporosis in her maternal grandmother and mother; Other in an other family member; Thyroid cancer in her sister; Thyroid disease in her sister.    Social History:  she  reports that she quit smoking about 25 years ago. Her smoking use included cigarettes. She has a 3.00 pack-year smoking history. She has never used smokeless tobacco. She reports that she drinks about 3.0 standard drinks of alcohol per week. She reports that she does not use drugs.    Pertinent Findings on   Review of Systems   Constitutional: Negative for appetite change, chills, diaphoresis, fatigue, fever and unexpected weight change.   HENT:   Negative for hearing loss, lump/mass, mouth sores, nosebleeds, sore throat, tinnitus, trouble swallowing and voice change.    Eyes: Negative for eye problems and icterus.   Respiratory: Negative for chest tightness, cough, hemoptysis, shortness of breath and wheezing.    Cardiovascular: Negative for chest pain, leg swelling and palpitations.   Gastrointestinal: Negative for abdominal distention, abdominal pain, blood in stool, constipation, diarrhea, nausea, rectal pain and vomiting.   Endocrine: Negative for hot flashes.   Genitourinary: Negative for bladder incontinence, difficulty urinating, dyspareunia, dysuria, frequency, hematuria, menstrual problem, nocturia, pelvic pain, vaginal bleeding and vaginal discharge.    Musculoskeletal: Negative for arthralgias, back pain, flank pain, gait problem, myalgias, neck pain and neck stiffness.   Skin: Negative for itching, rash and wound.   Neurological: Negative for dizziness, extremity weakness, gait problem, headaches, light-headedness, numbness, seizures and speech  difficulty.   Hematological: Negative for adenopathy. Does not bruise/bleed easily.   Psychiatric/Behavioral: Negative for confusion, decreased concentration, depression, sleep disturbance and suicidal ideas. The patient is not nervous/anxious.    :     Subjective   There were no vitals filed for this visit.    Pertinent Findings on:  Physical Exam   Constitutional: She appears well-developed and well-nourished. No distress.   HENT:   Head: Normocephalic.   Neck: Normal range of motion. Neck supple.   Pulmonary/Chest: She exhibits no mass and no tenderness. Right breast exhibits no inverted nipple, no mass, no nipple discharge, no skin change and no tenderness. Left breast exhibits no inverted nipple, no mass, no nipple discharge, no skin change and no tenderness.   Musculoskeletal: Normal range of motion.   Lymphadenopathy:     She has no cervical adenopathy.     She has no axillary adenopathy.        Right axillary: No pectoral adenopathy present.        Left axillary: No pectoral adenopathy present.       Right: No supraclavicular adenopathy present.        Left: No supraclavicular adenopathy present.   Skin: Skin is intact. She is not diaphoretic.   Psychiatric: She has a normal mood and affect. Her speech is normal and behavior is normal. Judgment and thought content normal. Cognition and memory are normal.          Assessment: Ductal carcinoma in situ (DCIS) of left breast  This assessment comes from my review of the imaging, pathology, physician notes and other pertinent information as mentioned.    Plan:   We reviewed the specifics of her clinical, imaging and pathology findings today and also talked through the role of radiation therapy in breast conservation treatment and specifically in DCIS.  We talked thru the prognostic factors and the risk of local recurrence associated with those, both intradutal and invasive. We discussed specifically the size of the DCIS, margin status, grade with or without  necrosis and the hormone receptor status. She expressed understanding and asked multiple questions.  She let me know she is still considering the Tamoxifen use and given that she may not agree to that, she does very much want to pursue the radiation. She is also anxious to get that course of treatment completed as soon as possible - even after our covid-19 discussion and reviewing all the precautions.     Given the above, we discussed a course of postoperative whole breast radiation therapy consisting of 4256 cGy aimed at the intact left breast given over 16 treatments. The above course of treatment should be completed in 3 1/2 weeks.  She understands this gives her the best chance of local control.    We discussed the specifics, logistics and side effects of this course of treatment  which may involve acutely, irritation of the skin, including erythema and possibly moist desquamation, tenderness of the musculature of the chest wall and mild fatigue. We discussed the long term possibility of mild hyperpigmentation and fibrosis of the breast, mild increased incidence of rib fracture and radiation pneumonitis.  We also discussed the importance of our treatment planning process in protecting these underlying structures as much as possible, specifically ribs, heart and lung and I believe all her questions were answered to her satisfaction.      We specifically discussed our deep inspiration breath hold (DIBH) technique which allows us to treat only when the breast is removed as much as possible from the heart and lung volume, the slight increased time in treatment delivery and education regarding the breathing techniques and the important benefits and she was agreeable.    We will start our treatment planning process with a CAT scan as soon as we are able. My staff will be calling her to schedule that after our video visit and we will proceed on forward from there. I am planning on getting her treatment planning  finalized and treatments underway within a day or two.      Objective   I spent greater than 40 minutes in video face-to-face time with the patient and 30 minutes of that time were spent in counseling and coordination of care, including review of imaging and pathology; indications, goals, logistics, alternatives and risks - both common and rare - for my recommendations as well as surveillance and potential outcomes.

## 2020-04-09 ENCOUNTER — APPOINTMENT (OUTPATIENT)
Dept: RADIATION ONCOLOGY | Facility: HOSPITAL | Age: 55
End: 2020-04-09

## 2020-04-09 ENCOUNTER — OFFICE VISIT (OUTPATIENT)
Dept: RADIATION ONCOLOGY | Facility: HOSPITAL | Age: 55
End: 2020-04-09

## 2020-04-09 DIAGNOSIS — D05.12 DUCTAL CARCINOMA IN SITU (DCIS) OF LEFT BREAST: Primary | ICD-10-CM

## 2020-04-09 PROCEDURE — 99213 OFFICE O/P EST LOW 20 MIN: CPT | Performed by: RADIOLOGY

## 2020-04-09 PROCEDURE — 77290 THER RAD SIMULAJ FIELD CPLX: CPT | Performed by: RADIOLOGY

## 2020-04-09 PROCEDURE — 77333 RADIATION TREATMENT AID(S): CPT | Performed by: RADIOLOGY

## 2020-04-09 NOTE — PROGRESS NOTES
Ductal carcinoma in situ (DCIS) of left breast    Patient returns for re-evaluation today. She has continued to recover well postop, has thought more about the radiation therapy, is agreeable and is ready to begin treatment planning. All questions answered. Logistics and side effects of radiation reviewed. She would still like to proceed on with treatment as soon as possible, even given the current risks and restrictions with covid-19.    Breast exam shows healing incisions, minimal seroma. No contraindication to starting treatment planning. We were able to start our process with a scan today and she should be ready to begin treatment as soon as we can get the planning completed.    I spent greater than 15 minutes in face-to-face time with the patient and 10 minutes of that time were spent in counseling and coordination of care, including review of imaging and pathology; indications, goals, logistics, alternatives and risks - both common and rare - for my recommendations as well as surveillance and potential outcomes.

## 2020-04-10 PROCEDURE — 77295 3-D RADIOTHERAPY PLAN: CPT | Performed by: RADIOLOGY

## 2020-04-10 PROCEDURE — 77334 RADIATION TREATMENT AID(S): CPT | Performed by: RADIOLOGY

## 2020-04-10 PROCEDURE — 77300 RADIATION THERAPY DOSE PLAN: CPT | Performed by: RADIOLOGY

## 2020-04-10 PROCEDURE — 77293 RESPIRATOR MOTION MGMT SIMUL: CPT | Performed by: RADIOLOGY

## 2020-04-13 PROCEDURE — 77412 RADIATION TX DELIVERY LVL 3: CPT | Performed by: RADIOLOGY

## 2020-04-13 PROCEDURE — 77280 THER RAD SIMULAJ FIELD SMPL: CPT | Performed by: RADIOLOGY

## 2020-04-13 PROCEDURE — 77427 RADIATION TX MANAGEMENT X5: CPT | Performed by: RADIOLOGY

## 2020-04-14 PROCEDURE — 77412 RADIATION TX DELIVERY LVL 3: CPT | Performed by: RADIOLOGY

## 2020-04-14 PROCEDURE — 77387 GUIDANCE FOR RADJ TX DLVR: CPT | Performed by: RADIOLOGY

## 2020-04-14 PROCEDURE — 77336 RADIATION PHYSICS CONSULT: CPT | Performed by: RADIOLOGY

## 2020-04-15 PROCEDURE — 77412 RADIATION TX DELIVERY LVL 3: CPT | Performed by: RADIOLOGY

## 2020-04-15 PROCEDURE — 77387 GUIDANCE FOR RADJ TX DLVR: CPT | Performed by: RADIOLOGY

## 2020-04-16 ENCOUNTER — RADIATION ONCOLOGY WEEKLY ASSESSMENT (OUTPATIENT)
Dept: RADIATION ONCOLOGY | Facility: HOSPITAL | Age: 55
End: 2020-04-16

## 2020-04-16 DIAGNOSIS — D05.12 DUCTAL CARCINOMA IN SITU (DCIS) OF LEFT BREAST: Primary | ICD-10-CM

## 2020-04-16 PROCEDURE — 77387 GUIDANCE FOR RADJ TX DLVR: CPT | Performed by: RADIOLOGY

## 2020-04-16 PROCEDURE — 77412 RADIATION TX DELIVERY LVL 3: CPT | Performed by: RADIOLOGY

## 2020-04-16 NOTE — PROGRESS NOTES
Physician Weekly Management Note    Diagnosis:     1. Ductal carcinoma in situ (DCIS) of left breast       Reason for Visit: Radiation (4/16)    Concurrent Chemo:   No    Notes on Treatment course, Films, Medical progress and Plan:  Doing well. Breast fine. Developed a small erythematous rash under left arm. States she had a bad rash postop which resolved, now flaring. Discussed hypoallergenic deodorant? May just have persistent inflammation of the skin - cortisone topically as well. Will watch. No problems or questions with RT, cont on.    Performance Status: (1) Restricted in physically strenuous activity, ambulatory and able to do work of light nature  Subjective   ROS - Other than as listed above, as follow:  Constitutional - Normal - Denies lack of appetite, fatigue, fever, night sweats and change in weight.  Neck - Normal - Denies neck masses, muscle weakness, neck pain, decreased range of motion and swelling of the neck.  Breasts - Normal - Denies breast masses, nipple discharge, nipple inversion and pain.  Respiratory - Normal - Denies cough, dyspnea, hemoptysis, hiccoughs, pleuritic chest pain and wheezing.  Gastrointestinal - Normal - Denies abdominal pain, constipation, diarrhea, heartburn / dyspepsia, hematemesis, hemorrhoids, melena / GI bleeding, nausea, pain / cramping, satiety and vomiting.  Musculoskeletal - Normal - Denies arthritis, bone pain, joint pain, muscle weakness and decreased range of motion.   Hematologic/Lymphatic - Normal - Denies easy bruising and tender or enlarged lymph nodes.  There were no vitals filed for this visit.  Objective   PYHSICAL EXAM - Other than listed above, as follows:  Constitutional - Normal - No evidence of impaired alertness, inadequate appearance, premature or advanced chronologic age, uncooperativeness, altered mood and affect and disorientation.  Neck - Normal - No evidence of tender or enlarged lymph nodes, neck abnormalities, restricted range of motion and  "enlarged thyroid gland.  Breasts - Normal - No change in nipple or incision site.  Chest - Normal - No evidence of chest abnormalities and tender or enlarged lymph nodes.  Hematologic/Lymphatic -  Normal - No evidence of tender or enlarged axillae lymph nodes and tender or enlarged neck lymph nodes.     Technical aspects reviewed:  Weekly OBI approved if applicable? Yes  Weekly port films approved?   Yes  Change requests noted if applicable? Yes  Patient setup and plan reviewed?  Yes    Chart Reviewed:  Continue current treatment plan?   Yes  Treatment plan change requested?  No    I have reviewed and marked as \"reviewed\" the current medications, allergies and problem list in the patients EMR.  I have reviewed the patient's medical, surgical  history in detail, reviewed any pertinent lab work  and updated the computerized patient record if needed.    Patient's Care Team:  Patient Care Team:  Provider, No Known as PCP - Sadie Christine MD as Consulting Physician (Radiation Oncology)  Tim Elam MD as Consulting Physician (Hematology and Oncology)  Harshad Eduardo MD as Referring Physician (Breast Surgery)      "

## 2020-04-17 PROCEDURE — 77412 RADIATION TX DELIVERY LVL 3: CPT | Performed by: RADIOLOGY

## 2020-04-17 PROCEDURE — 77387 GUIDANCE FOR RADJ TX DLVR: CPT | Performed by: RADIOLOGY

## 2020-04-20 PROCEDURE — 77412 RADIATION TX DELIVERY LVL 3: CPT | Performed by: RADIOLOGY

## 2020-04-20 PROCEDURE — 77387 GUIDANCE FOR RADJ TX DLVR: CPT | Performed by: RADIOLOGY

## 2020-04-20 PROCEDURE — 77427 RADIATION TX MANAGEMENT X5: CPT | Performed by: RADIOLOGY

## 2020-04-21 PROCEDURE — 77387 GUIDANCE FOR RADJ TX DLVR: CPT | Performed by: RADIOLOGY

## 2020-04-21 PROCEDURE — 77336 RADIATION PHYSICS CONSULT: CPT | Performed by: RADIOLOGY

## 2020-04-21 PROCEDURE — 77412 RADIATION TX DELIVERY LVL 3: CPT | Performed by: RADIOLOGY

## 2020-04-22 ENCOUNTER — RADIATION ONCOLOGY WEEKLY ASSESSMENT (OUTPATIENT)
Dept: RADIATION ONCOLOGY | Facility: HOSPITAL | Age: 55
End: 2020-04-22

## 2020-04-22 DIAGNOSIS — D05.12 DUCTAL CARCINOMA IN SITU (DCIS) OF LEFT BREAST: Primary | ICD-10-CM

## 2020-04-22 PROCEDURE — 77412 RADIATION TX DELIVERY LVL 3: CPT | Performed by: RADIOLOGY

## 2020-04-22 PROCEDURE — 77387 GUIDANCE FOR RADJ TX DLVR: CPT | Performed by: RADIOLOGY

## 2020-04-22 NOTE — PROGRESS NOTES
Physician Weekly Management Note    Diagnosis:     1. Ductal carcinoma in situ (DCIS) of left breast       Reason for Visit: Radiation (8/16)    Concurrent Chemo:   No    Notes on Treatment course, Films, Medical progress and Plan:  Doing well. Breast fine. Rash under left arm better. Remainder of skin looks great. Will watch. No problems or questions with RT, cont on.    Performance Status: (1) Restricted in physically strenuous activity, ambulatory and able to do work of light nature  Subjective   ROS - Other than as listed above, as follow:  Constitutional - Normal - Denies lack of appetite, fatigue, fever, night sweats and change in weight.  Neck - Normal - Denies neck masses, muscle weakness, neck pain, decreased range of motion and swelling of the neck.  Breasts - Normal - Denies breast masses, nipple discharge, nipple inversion and pain.  Respiratory - Normal - Denies cough, dyspnea, hemoptysis, hiccoughs, pleuritic chest pain and wheezing.  Gastrointestinal - Normal - Denies abdominal pain, constipation, diarrhea, heartburn / dyspepsia, hematemesis, hemorrhoids, melena / GI bleeding, nausea, pain / cramping, satiety and vomiting.  Musculoskeletal - Normal - Denies arthritis, bone pain, joint pain, muscle weakness and decreased range of motion.   Hematologic/Lymphatic - Normal - Denies easy bruising and tender or enlarged lymph nodes.  There were no vitals filed for this visit.  Objective   PYHSICAL EXAM - Other than listed above, as follows:  Constitutional - Normal - No evidence of impaired alertness, inadequate appearance, premature or advanced chronologic age, uncooperativeness, altered mood and affect and disorientation.  Neck - Normal - No evidence of tender or enlarged lymph nodes, neck abnormalities, restricted range of motion and enlarged thyroid gland.  Breasts - Normal - No change in nipple or incision site.  Chest - Normal - No evidence of chest abnormalities and tender or enlarged lymph  "nodes.  Hematologic/Lymphatic -  Normal - No evidence of tender or enlarged axillae lymph nodes and tender or enlarged neck lymph nodes.     Technical aspects reviewed:  Weekly OBI approved if applicable? Yes  Weekly port films approved?   Yes  Change requests noted if applicable? Yes  Patient setup and plan reviewed?  Yes    Chart Reviewed:  Continue current treatment plan?   Yes  Treatment plan change requested?  No    I have reviewed and marked as \"reviewed\" the current medications, allergies and problem list in the patients EMR.  I have reviewed the patient's medical, surgical  history in detail, reviewed any pertinent lab work  and updated the computerized patient record if needed.    Patient's Care Team:  Patient Care Team:  Provider, No Known as PCP - Sadie Christine MD as Consulting Physician (Radiation Oncology)  Tim Elam MD as Consulting Physician (Hematology and Oncology)  Harshad Eduardo MD as Referring Physician (Breast Surgery)      "

## 2020-04-23 PROCEDURE — 77412 RADIATION TX DELIVERY LVL 3: CPT | Performed by: RADIOLOGY

## 2020-04-23 PROCEDURE — 77387 GUIDANCE FOR RADJ TX DLVR: CPT | Performed by: RADIOLOGY

## 2020-04-24 PROCEDURE — 77412 RADIATION TX DELIVERY LVL 3: CPT | Performed by: RADIOLOGY

## 2020-04-24 PROCEDURE — 77387 GUIDANCE FOR RADJ TX DLVR: CPT | Performed by: RADIOLOGY

## 2020-04-27 PROCEDURE — 77412 RADIATION TX DELIVERY LVL 3: CPT | Performed by: RADIOLOGY

## 2020-04-27 PROCEDURE — 77387 GUIDANCE FOR RADJ TX DLVR: CPT | Performed by: RADIOLOGY

## 2020-04-27 PROCEDURE — 77427 RADIATION TX MANAGEMENT X5: CPT | Performed by: RADIOLOGY

## 2020-04-28 PROCEDURE — 77412 RADIATION TX DELIVERY LVL 3: CPT | Performed by: RADIOLOGY

## 2020-04-28 PROCEDURE — 77336 RADIATION PHYSICS CONSULT: CPT | Performed by: RADIOLOGY

## 2020-04-28 PROCEDURE — 77387 GUIDANCE FOR RADJ TX DLVR: CPT | Performed by: RADIOLOGY

## 2020-04-29 ENCOUNTER — RADIATION ONCOLOGY WEEKLY ASSESSMENT (OUTPATIENT)
Dept: RADIATION ONCOLOGY | Facility: HOSPITAL | Age: 55
End: 2020-04-29

## 2020-04-29 DIAGNOSIS — D05.12 DUCTAL CARCINOMA IN SITU (DCIS) OF LEFT BREAST: Primary | ICD-10-CM

## 2020-04-29 PROCEDURE — 77412 RADIATION TX DELIVERY LVL 3: CPT | Performed by: RADIOLOGY

## 2020-04-29 PROCEDURE — 77387 GUIDANCE FOR RADJ TX DLVR: CPT | Performed by: RADIOLOGY

## 2020-04-29 NOTE — PROGRESS NOTES
Physician Weekly Management Note    Diagnosis:     1. Ductal carcinoma in situ (DCIS) of left breast       Reason for Visit: Radiation (13/16)    Concurrent Chemo:   No    Notes on Treatment course, Films, Medical progress and Plan:  Doing well. Breast fine. Rash under left arm better. Remainder of skin looks great. No problems or questions with RT, cont on.    Performance Status: (1) Restricted in physically strenuous activity, ambulatory and able to do work of light nature  Subjective   ROS - Other than as listed above, as follow:  Constitutional - Normal - Denies lack of appetite, fatigue, fever, night sweats and change in weight.  Neck - Normal - Denies neck masses, muscle weakness, neck pain, decreased range of motion and swelling of the neck.  Breasts - Normal - Denies breast masses, nipple discharge, nipple inversion and pain.  Respiratory - Normal - Denies cough, dyspnea, hemoptysis, hiccoughs, pleuritic chest pain and wheezing.  Gastrointestinal - Normal - Denies abdominal pain, constipation, diarrhea, heartburn / dyspepsia, hematemesis, hemorrhoids, melena / GI bleeding, nausea, pain / cramping, satiety and vomiting.  Musculoskeletal - Normal - Denies arthritis, bone pain, joint pain, muscle weakness and decreased range of motion.   Hematologic/Lymphatic - Normal - Denies easy bruising and tender or enlarged lymph nodes.  There were no vitals filed for this visit.  Objective   PYHSICAL EXAM - Other than listed above, as follows:  Constitutional - Normal - No evidence of impaired alertness, inadequate appearance, premature or advanced chronologic age, uncooperativeness, altered mood and affect and disorientation.  Neck - Normal - No evidence of tender or enlarged lymph nodes, neck abnormalities, restricted range of motion and enlarged thyroid gland.  Breasts - Normal - No change in nipple or incision site.  Chest - Normal - No evidence of chest abnormalities and tender or enlarged lymph  "nodes.  Hematologic/Lymphatic -  Normal - No evidence of tender or enlarged axillae lymph nodes and tender or enlarged neck lymph nodes.     Technical aspects reviewed:  Weekly OBI approved if applicable? Yes  Weekly port films approved?   Yes  Change requests noted if applicable? Yes  Patient setup and plan reviewed?  Yes    Chart Reviewed:  Continue current treatment plan?   Yes  Treatment plan change requested?  No    I have reviewed and marked as \"reviewed\" the current medications, allergies and problem list in the patients EMR.  I have reviewed the patient's medical, surgical  history in detail, reviewed any pertinent lab work  and updated the computerized patient record if needed.    Patient's Care Team:  Patient Care Team:  Provider, No Known as PCP - Sadie Christine MD as Consulting Physician (Radiation Oncology)  Tim Elam MD as Consulting Physician (Hematology and Oncology)  Harshad Eduardo MD as Referring Physician (Breast Surgery)      "

## 2020-04-30 PROCEDURE — 77412 RADIATION TX DELIVERY LVL 3: CPT | Performed by: RADIOLOGY

## 2020-04-30 PROCEDURE — 77387 GUIDANCE FOR RADJ TX DLVR: CPT | Performed by: RADIOLOGY

## 2020-05-01 ENCOUNTER — APPOINTMENT (OUTPATIENT)
Dept: RADIATION ONCOLOGY | Facility: HOSPITAL | Age: 55
End: 2020-05-01

## 2020-05-01 ENCOUNTER — TELEPHONE (OUTPATIENT)
Dept: PHARMACY | Facility: HOSPITAL | Age: 55
End: 2020-05-01

## 2020-05-01 PROCEDURE — 77412 RADIATION TX DELIVERY LVL 3: CPT | Performed by: RADIOLOGY

## 2020-05-01 PROCEDURE — 77387 GUIDANCE FOR RADJ TX DLVR: CPT | Performed by: RADIOLOGY

## 2020-05-01 RX ORDER — TAMOXIFEN CITRATE 20 MG/1
TABLET ORAL
Qty: 30 TABLET | Refills: 1 | Status: SHIPPED | OUTPATIENT
Start: 2020-05-01 | End: 2020-06-01

## 2020-05-01 NOTE — TELEPHONE ENCOUNTER
Called pt to check and see if pt started the tamoxifen. She hasn't started yet. She will after radiation is completed. I told her I can approve more refills but she would have to check with pharmacy to determine whether the expiration date is too soon since she is saving the medication for a later date.

## 2020-05-04 ENCOUNTER — DOCUMENTATION (OUTPATIENT)
Dept: RADIATION ONCOLOGY | Facility: CLINIC | Age: 55
End: 2020-05-04

## 2020-05-04 DIAGNOSIS — D05.12 DUCTAL CARCINOMA IN SITU (DCIS) OF LEFT BREAST: Primary | ICD-10-CM

## 2020-05-04 PROCEDURE — 77412 RADIATION TX DELIVERY LVL 3: CPT | Performed by: RADIOLOGY

## 2020-05-04 PROCEDURE — 77387 GUIDANCE FOR RADJ TX DLVR: CPT | Performed by: RADIOLOGY

## 2020-05-06 NOTE — PROGRESS NOTES
RADIATION THERAPY TREATMENT SUMMARY  Patient: Rachelle Corbin  YOB: 1965  Diagnosis:    Ductal carcinoma in situ (DCIS) of left breast      Rachelle Corbin has recently completed the course of radiation therapy prescribed for the above-mentioned diagnosis.  Below, please find the specifics of the course of treatment delivered:    Radiation Details:    Tx Start Date  4/13/2020   Tx End date  5/4/2020  Intent   Curative   Total Treatments 16  Prescription Name LEFT BRST  Site   Breast, Left  Primary/Boost  PRIMARY  Dose Per Fraction 266 cGy/Frac  Number of Fractions 16  Prescribe To  Volume PTV_EvalBrst  4256 cGy  266 cGy/Frac  Mode    Photon  Technique  TANGENTS  Frequency  5 Times a week  Energy   6X/18X  Prescription Note Planned to 100% calc point     Tolerance and Toxicities: she tolerated the treatments well, requiring no treatment breaks. she completed the treatments in 21 elapsed days.    Follow-up Plans:  I have asked the patient to return to see me in approximately 4 weeks.  I have also made a referral to our Survivorship Clinic.

## 2020-05-26 ENCOUNTER — OFFICE VISIT (OUTPATIENT)
Dept: MAMMOGRAPHY | Facility: CLINIC | Age: 55
End: 2020-05-26

## 2020-05-26 VITALS — SYSTOLIC BLOOD PRESSURE: 124 MMHG | DIASTOLIC BLOOD PRESSURE: 80 MMHG

## 2020-05-26 DIAGNOSIS — D05.12 DUCTAL CARCINOMA IN SITU (DCIS) OF LEFT BREAST: Primary | ICD-10-CM

## 2020-05-26 PROCEDURE — 99024 POSTOP FOLLOW-UP VISIT: CPT | Performed by: SURGERY

## 2020-05-26 NOTE — PROGRESS NOTES
Chief Complaint: Rachelle Corbin is a  54 y.o. female, initially referred by No ref. provider found , who is here today for a postoperative visit.    History of Present Illness:  In the interim,Rachelle Corbin has completed her 3 weeks of radiation treatment.  She did have some skin blistering but she is recovered pretty well at this point in time.    She has noted no redness, warmth,drainage, swelling at the incision site. Denies fever or chills.      Current Outpatient Medications:   •  acetaminophen (TYLENOL) 500 MG tablet, Take 1,000 mg by mouth Every 6 (Six) Hours As Needed for Mild Pain ., Disp: , Rfl:   •  HYDROcodone-acetaminophen (NORCO) 5-325 MG per tablet, Take 1-2 tablets by mouth Every 4 (Four) Hours As Needed (Pain)., Disp: 8 tablet, Rfl: 0  •  HYDROcodone-acetaminophen (NORCO) 5-325 MG per tablet, Take 1-2 tablets by mouth Every 4 (Four) Hours As Needed (Pain)., Disp: 6 tablet, Rfl: 0  •  tamoxifen (NOLVADEX) 20 MG chemo tablet, TAKE 1 TABLET BY MOUTH EVERY DAY, Disp: 30 tablet, Rfl: 1  •  valACYclovir (VALTREX) 1000 MG tablet, Take 1,000 mg by mouth Daily., Disp: , Rfl: 11  Physical examination  Left breast- she has a healing lumpectomy incision in the upper outer quadrant.  The overlying skin is rather dry and slightly thickened from the radiation.  I do not feel any worrisome lumps or significant architectural distortion.  There are no lumps in the rest of the breast either.  Lymphatics-there is no evidence of left axillary adenopathy  Assessment:  DCIS left breast status post lumpectomy.  Patient has completed radiation and is taking tamoxifen.  She is having some hot flashes but otherwise appears to be tolerating it well.    Plan:  She will continue to follow every 6 months with the medical oncologist.  I have encouraged her to get a mammogram a year from her last one.  I will see her on an as-needed basis at this point in time.          EMR Dragon/transcription disclaimer:    Much of this  encounter note is an electronic transcription/translocation of spoken language to printed text.  The electronic translation of spoken language may permit erroneous, or at times, nonsensical words or phrases to be inadvertently transcribed.  Although I have reviewed the note from such areas, some may still exist.

## 2020-06-01 RX ORDER — TAMOXIFEN CITRATE 20 MG/1
TABLET ORAL
Qty: 90 TABLET | Refills: 1 | Status: SHIPPED | OUTPATIENT
Start: 2020-06-01 | End: 2021-12-07

## 2020-06-02 ENCOUNTER — TELEMEDICINE (OUTPATIENT)
Dept: RADIATION ONCOLOGY | Facility: HOSPITAL | Age: 55
End: 2020-06-02

## 2020-06-02 DIAGNOSIS — D05.12 DUCTAL CARCINOMA IN SITU (DCIS) OF LEFT BREAST: Primary | ICD-10-CM

## 2020-06-02 PROCEDURE — 99024 POSTOP FOLLOW-UP VISIT: CPT | Performed by: RADIOLOGY

## 2020-06-02 NOTE — PROGRESS NOTES
DIAGNOSIS and REASON FOR VIDEO FOLLOW UP:Ductal carcinoma in situ (DCIS) of left breast        CHIEF COMPLAINT:  4 week follow up  I had the pleasure of seeing Rachelle Corbin  Via VIDEO visit today, now approximately 4 weeks out from the radiation therapy portion of her treatment for the above mentioned diagnosis. Clinically she is doing wonderfully well.  Her energy has improved and she states her performance status is nearly back to baseline.  She has no new complaints regarding the breast.  She did have some tenderness in the breast for a couple of days after treatment but that has resolved.     Subjective    Past Medical History: she  has a past medical history of Anesthesia complication, Breast cancer, left (CMS/HCC) (02/2020), Cancer (CMS/[facility]), Environmental and seasonal allergies, Herpes, History of iron deficiency anemia (2016), Kidney stones (2016), Mammogram abnormal, and Stress incontinence.    Past Surgical History:  she has a past surgical history that includes Vaginal hysterectomy (2016); Lacrimal duct probing w/ stent placement (Right); Breast lumpectomy (Right); Breast biopsy (Right); Breast biopsy (Left, 2/12/2020); Breast biopsy (Left, 3/4/2020); Kidney stone surgery (2001); Tonsillectomy and adenoidectomy; and Endometrial ablation (12/01/2010).    Meds:    Current Outpatient Medications:   •  acetaminophen (TYLENOL) 500 MG tablet, Take 1,000 mg by mouth Every 6 (Six) Hours As Needed for Mild Pain ., Disp: , Rfl:   •  HYDROcodone-acetaminophen (NORCO) 5-325 MG per tablet, Take 1-2 tablets by mouth Every 4 (Four) Hours As Needed (Pain)., Disp: 8 tablet, Rfl: 0  •  HYDROcodone-acetaminophen (NORCO) 5-325 MG per tablet, Take 1-2 tablets by mouth Every 4 (Four) Hours As Needed (Pain)., Disp: 6 tablet, Rfl: 0  •  tamoxifen (NOLVADEX) 20 MG chemo tablet, TAKE 1 TABLET BY MOUTH EVERY DAY, Disp: 90 tablet, Rfl: 1  •  valACYclovir (VALTREX) 1000 MG tablet, Take 1,000 mg by mouth Daily., Disp: , Rfl:  11    Allergies:    Allergies   Allergen Reactions   • Adhesive Tape Hives and Rash     tegaderm  ekg leads       Family History:  her family history includes Colon cancer in an other family member; Coronary artery disease in her maternal grandmother and paternal grandfather; Coronary artery disease (age of onset: 48) in her father; Diabetes in an other family member; Heart attack in her father; Heart disease in her father; Lung cancer in her paternal uncle; Osteoporosis in her maternal grandmother and mother; Other in an other family member; Thyroid cancer in her sister; Thyroid disease in her sister.    Social History:  she  reports that she quit smoking about 25 years ago. Her smoking use included cigarettes. She has a 3.00 pack-year smoking history. She has never used smokeless tobacco. She reports that she drinks about 3.0 standard drinks of alcohol per week. She reports that she does not use drugs.    Pertinent Findings on Review of Systems:  Fourteen systems were reviewed and are negative other than as mentioned above.     Objective   Pertinent Findings on Physical Exam:  There were no vitals filed for this visit.   Unable to perform, televisit    Performance Status:  (0) Fully active, able to carry on all predisease performance without restriction       Assessment: DCIS of the left breast  Doing wonderfully well, 4 weeks out from radiation therapy    Plan:   We reviewed today the current NCCN guidelines for her surveillance and follow up, specifically the need for physician visit every 4 to 6 months for 5 years, annual mammogram, annual gynecologic assessment, continued monitoring of bone health and achieving and maintaining an ideal BMI.      She does continue on the Tamoxifen without difficulty.  She does not appear to have a return visit with Dr. Elam so she will call today and schedule that. She started the medication around May 5th. She understands she will continue follow-up through that office while  on the medication. Regarding her mammographic follow-up, she knows to continue on with her annual bilateral mammogram in late November, 2019.  Given the above, I have not given her an appointment to return here, but encouraged her to call if I can be of any further help in her care and thank you again for allowing us to participate in her care.

## 2020-06-15 ENCOUNTER — APPOINTMENT (OUTPATIENT)
Dept: LAB | Facility: HOSPITAL | Age: 55
End: 2020-06-15

## 2020-06-15 ENCOUNTER — OFFICE VISIT (OUTPATIENT)
Dept: ONCOLOGY | Facility: CLINIC | Age: 55
End: 2020-06-15

## 2020-06-15 VITALS
HEIGHT: 68 IN | RESPIRATION RATE: 12 BRPM | BODY MASS INDEX: 27.63 KG/M2 | TEMPERATURE: 98.2 F | SYSTOLIC BLOOD PRESSURE: 142 MMHG | HEART RATE: 93 BPM | WEIGHT: 182.3 LBS | DIASTOLIC BLOOD PRESSURE: 86 MMHG | OXYGEN SATURATION: 98 %

## 2020-06-15 DIAGNOSIS — D05.12 DUCTAL CARCINOMA IN SITU (DCIS) OF LEFT BREAST: Primary | ICD-10-CM

## 2020-06-15 PROCEDURE — 99213 OFFICE O/P EST LOW 20 MIN: CPT | Performed by: INTERNAL MEDICINE

## 2020-06-15 NOTE — PROGRESS NOTES
Subjective     REASON FOR CONSULTATION:  DCIS of the left breast  Provide an opinion on any further workup or treatment                             REQUESTING PHYSICIAN:  Dr. Eduardo    RECORDS OBTAINED:  Records of the patients history including those obtained from the referring provider were reviewed and summarized in detail.      History of Present Illness   This is a pleasant 54-year-old woman seen today by video visit for recent diagnosis of ductal carcinoma in situ of the left breast.  The patient had an abnormal screening mammography performed 11/22/2019 showing abnormal calcifications in the upper outer quadrant of the left breast.  A stereotactic guided left biopsy was attempted but unable to be performed secondary to the location and faint character of the calcifications in the posterior one third upper outer quadrant of the left breast.  She was referred to surgery and underwent a needle localization excisional biopsy of the left breast on 2/12/2020 which showed intermediate grade ductal carcinoma in situ with solid and cribriform types, associated necrosis and calcifications.  The DCIS spanned 18 mm.  Margins were negative closest distance 0.2 mm from the anterior margin.  Separate focus of atypical ductal hyperplasia with calcifications also noted.  MRI of the breasts on 2/25/2020 showed a large postsurgical seroma in the upper outer quadrant of the left breast but no suspicious residual enhancement at this location or elsewhere in either breast.  She was taken back to the operating room 3/4/2020 for excision of margins with negative pathology for atypia, in situ or invasive disease.    The patient has no prior history of breast cancer.  She has no familial history of breast or ovarian cancers.  The patient has had a previous hysterectomy for treatment of menorrhagia; she still has ovaries in place.  She is unsure if she is postmenopausal and states her GYN lisa labs in November 2019.  The patient  "has no prior history of venous thromboembolism.  She has never had a bone density but her mother had severe osteoporosis.    The patient completed postlumpectomy radiation therapy to the breast.  She initiated tamoxifen 20 mg daily in early May 2020.  She has been tolerating this overall well with no significant vasomotor symptoms.  She does report decreased libido but is unsure if it is related to tamoxifen, radiation, stress etc.    Past Medical History:   Diagnosis Date   • Anesthesia complication     \"I JUST CAN'T COME OUT OF IT.\"   • Breast cancer, left (CMS/[facility]) 02/2020   • Cancer (CMS/[facility])     SKIN CANCER-FOREHEAD SQUAMOUS CELL    • Environmental and seasonal allergies    • Herpes    • History of iron deficiency anemia 2016    Menorrhagia prior to hysterectomy   • Kidney stones 2016   • Mammogram abnormal     LEFT   • Stress incontinence         Past Surgical History:   Procedure Laterality Date   • BREAST BIOPSY Right     Benign   • BREAST BIOPSY Left 2/12/2020    Procedure: BREAST BIOPSY WITH NEEDLE LOCALIZATION;  Surgeon: Harshad Eduardo MD;  Location: [facility];  Service: General;  Laterality: Left;   • BREAST BIOPSY Left 3/4/2020    Procedure: RE EXCISION ANTERIOR MARGIN OF LEFT BREAST LUMPECTOMY CAVITY;  Surgeon: Harshad Eduardo MD;  Location: [facility];  Service: General;  Laterality: Left;   • BREAST LUMPECTOMY Right     Benign   • ENDOMETRIAL ABLATION  12/01/2010   • KIDNEY STONE SURGERY  2001   • LACRIMAL DUCT PROBING W/ STENT PLACEMENT Right    • TONSILLECTOMY AND ADENOIDECTOMY     • VAGINAL HYSTERECTOMY  2016        Current Outpatient Medications on File Prior to Visit   Medication Sig Dispense Refill   • acetaminophen (TYLENOL) 500 MG tablet Take 1,000 mg by mouth Every 6 (Six) Hours As Needed for Mild Pain .     • HYDROcodone-acetaminophen (NORCO) 5-325 MG per tablet Take 1-2 tablets by mouth Every 4 (Four) Hours As Needed (Pain). 6 tablet 0   • tamoxifen (NOLVADEX) 20 MG " chemo tablet TAKE 1 TABLET BY MOUTH EVERY DAY 90 tablet 1   • valACYclovir (VALTREX) 1000 MG tablet Take 1,000 mg by mouth Daily.  11   • [DISCONTINUED] HYDROcodone-acetaminophen (NORCO) 5-325 MG per tablet Take 1-2 tablets by mouth Every 4 (Four) Hours As Needed (Pain). 8 tablet 0     No current facility-administered medications on file prior to visit.         ALLERGIES:    Allergies   Allergen Reactions   • Adhesive Tape Hives and Rash     tegaderm  ekg leads        Social History     Socioeconomic History   • Marital status:      Spouse name: Wayne   • Number of children: 1   • Years of education: Not on file   • Highest education level: Not on file   Occupational History     Employer: SELF-EMPLOYED   Tobacco Use   • Smoking status: Former Smoker     Packs/day: 1.00     Years: 3.00     Pack years: 3.00     Types: Cigarettes     Last attempt to quit:      Years since quittin.4   • Smokeless tobacco: Never Used   Substance and Sexual Activity   • Alcohol use: Yes     Alcohol/week: 3.0 standard drinks     Types: 1 Glasses of wine, 1 Cans of beer, 1 Shots of liquor per week   • Drug use: No   • Sexual activity: Defer        Family History   Problem Relation Age of Onset   • Osteoporosis Mother    • Coronary artery disease Father 48         at 54   • Heart disease Father    • Heart attack Father    • Coronary artery disease Maternal Grandmother         fatal MI at 70   • Osteoporosis Maternal Grandmother    • Coronary artery disease Paternal Grandfather    • Thyroid cancer Sister    • Thyroid disease Sister    • Lung cancer Paternal Uncle    • Other Other         Potter syndrome   • Colon cancer Other    • Diabetes Other    • Breast cancer Neg Hx    • Hypotension Neg Hx    • Hypertension Neg Hx    • Malig Hyperthermia Neg Hx         Review of Systems   Constitutional: Negative.    HENT: Negative.    Respiratory: Negative.    Cardiovascular: Negative.    Gastrointestinal: Negative.    Genitourinary:  "Negative.    Musculoskeletal: Negative.    Skin: Negative.    Allergic/Immunologic: Negative.    Neurological: Negative.    Hematological: Negative.    Psychiatric/Behavioral: Negative.         Decreased libido          Objective     Vitals:    06/15/20 1106   BP: 142/86   Pulse: 93   Resp: 12   Temp: 98.2 °F (36.8 °C)   TempSrc: Tympanic   SpO2: 98%   Weight: 82.7 kg (182 lb 4.8 oz)   Height: 171.5 cm (67.52\")   PainSc: 0-No pain     Current Status 6/15/2020   ECOG score 0       Physical Exam    CON: pleasant well-developed adult woman  HEENT: no icterus, no thrush, moist membranes  LYMPH: no cervical or supraclavicular lad  CV: RRR, S1S2, no murmur  RESP: cta bilat, no wheezing, no rales  GI: soft, non-tender, no splenomegaly, +bs  MUSC: no edema, normal gait  NEURO: alert and oriented x3, normal strength  PSYCH: normal mood and affect      RECENT LABS:  Hematology WBC   Date Value Ref Range Status   02/05/2020 3.89 3.40 - 10.80 10*3/mm3 Final   06/12/2018 5.98 4.50 - 10.70 10*3/mm3 Final     RBC   Date Value Ref Range Status   02/05/2020 4.55 3.77 - 5.28 10*6/mm3 Final   06/12/2018 4.61 3.90 - 5.20 10*6/mm3 Final     Hemoglobin   Date Value Ref Range Status   02/05/2020 14.2 12.0 - 15.9 g/dL Final     Hematocrit   Date Value Ref Range Status   02/05/2020 41.2 34.0 - 46.6 % Final     Platelets   Date Value Ref Range Status   02/05/2020 241 140 - 450 10*3/mm3 Final      Imaging/path per HPI    Assessment/Plan   1.  Ductal carcinoma in situ of the left breast, intermediate grade with necrosis and calcifications, ER +91 200% of cells, DE +21 to 30% of cells status post lumpectomy with negative margins  2.  Atypical ductal hyperplasia of the breast    The patient completed postlumpectomy radiation therapy.  She initiated tamoxifen 20 mg daily early May 2020.  She seems to be tolerating medication well other than reported decreased libido which is unclear if related to tamoxifen at this time.  I recommended to continue " tamoxifen and I would anticipate 5 years of therapy if continued tolerance.  I recommended she continue a monthly self breast exam.  She is due annual mammography after 11/22/2020 and her gynecologist Dr. Somers typically orders this for her.  6-month follow-up requested.

## 2020-09-23 ENCOUNTER — HOSPITAL ENCOUNTER (EMERGENCY)
Facility: HOSPITAL | Age: 55
Discharge: HOME | End: 2020-09-23
Attending: EMERGENCY MEDICINE | Admitting: EMERGENCY MEDICINE

## 2020-09-23 ENCOUNTER — OFFICE VISIT (OUTPATIENT)
Dept: OTHER | Facility: HOSPITAL | Age: 55
End: 2020-09-23

## 2020-09-23 ENCOUNTER — APPOINTMENT (OUTPATIENT)
Dept: CT IMAGING | Facility: HOSPITAL | Age: 55
End: 2020-09-23

## 2020-09-23 VITALS — RESPIRATION RATE: 20 BRPM

## 2020-09-23 VITALS
OXYGEN SATURATION: 98 % | SYSTOLIC BLOOD PRESSURE: 142 MMHG | RESPIRATION RATE: 20 BRPM | TEMPERATURE: 98.2 F | HEART RATE: 70 BPM | DIASTOLIC BLOOD PRESSURE: 88 MMHG

## 2020-09-23 DIAGNOSIS — D05.12 DUCTAL CARCINOMA IN SITU (DCIS) OF LEFT BREAST: Primary | ICD-10-CM

## 2020-09-23 DIAGNOSIS — N20.1 RIGHT URETERAL STONE: Primary | ICD-10-CM

## 2020-09-23 LAB
ALBUMIN SERPL-MCNC: 4 G/DL (ref 3.5–5.2)
ALBUMIN/GLOB SERPL: 1.5 G/DL
ALP SERPL-CCNC: 104 U/L (ref 39–117)
ALT SERPL W P-5'-P-CCNC: 14 U/L (ref 1–33)
ANION GAP SERPL CALCULATED.3IONS-SCNC: 6 MMOL/L (ref 5–15)
AST SERPL-CCNC: 15 U/L (ref 1–32)
BACTERIA UR QL AUTO: ABNORMAL /HPF
BASOPHILS # BLD AUTO: 0.05 10*3/MM3 (ref 0–0.2)
BASOPHILS NFR BLD AUTO: 1 % (ref 0–1.5)
BILIRUB SERPL-MCNC: 0.3 MG/DL (ref 0–1.2)
BILIRUB UR QL STRIP: NEGATIVE
BUN SERPL-MCNC: 13 MG/DL (ref 6–20)
BUN/CREAT SERPL: 15.7 (ref 7–25)
CALCIUM SPEC-SCNC: 9.1 MG/DL (ref 8.6–10.5)
CHLORIDE SERPL-SCNC: 107 MMOL/L (ref 98–107)
CLARITY UR: CLEAR
CO2 SERPL-SCNC: 27 MMOL/L (ref 22–29)
COLOR UR: ABNORMAL
CREAT SERPL-MCNC: 0.83 MG/DL (ref 0.57–1)
DEPRECATED RDW RBC AUTO: 38.6 FL (ref 37–54)
EOSINOPHIL # BLD AUTO: 0.2 10*3/MM3 (ref 0–0.4)
EOSINOPHIL NFR BLD AUTO: 3.8 % (ref 0.3–6.2)
ERYTHROCYTE [DISTWIDTH] IN BLOOD BY AUTOMATED COUNT: 12 % (ref 12.3–15.4)
GFR SERPL CREATININE-BSD FRML MDRD: 71 ML/MIN/1.73
GLOBULIN UR ELPH-MCNC: 2.6 GM/DL
GLUCOSE SERPL-MCNC: 82 MG/DL (ref 65–99)
GLUCOSE UR STRIP-MCNC: NEGATIVE MG/DL
HCT VFR BLD AUTO: 35.5 % (ref 34–46.6)
HGB BLD-MCNC: 12.5 G/DL (ref 12–15.9)
HGB UR QL STRIP.AUTO: NEGATIVE
HOLD SPECIMEN: NORMAL
HOLD SPECIMEN: NORMAL
HYALINE CASTS UR QL AUTO: ABNORMAL /LPF
IMM GRANULOCYTES # BLD AUTO: 0.02 10*3/MM3 (ref 0–0.05)
IMM GRANULOCYTES NFR BLD AUTO: 0.4 % (ref 0–0.5)
KETONES UR QL STRIP: NEGATIVE
LEUKOCYTE ESTERASE UR QL STRIP.AUTO: NEGATIVE
LIPASE SERPL-CCNC: 31 U/L (ref 13–60)
LYMPHOCYTES # BLD AUTO: 1.26 10*3/MM3 (ref 0.7–3.1)
LYMPHOCYTES NFR BLD AUTO: 24.2 % (ref 19.6–45.3)
MCH RBC QN AUTO: 31.5 PG (ref 26.6–33)
MCHC RBC AUTO-ENTMCNC: 35.2 G/DL (ref 31.5–35.7)
MCV RBC AUTO: 89.4 FL (ref 79–97)
MONOCYTES # BLD AUTO: 0.43 10*3/MM3 (ref 0.1–0.9)
MONOCYTES NFR BLD AUTO: 8.3 % (ref 5–12)
NEUTROPHILS NFR BLD AUTO: 3.24 10*3/MM3 (ref 1.7–7)
NEUTROPHILS NFR BLD AUTO: 62.3 % (ref 42.7–76)
NITRITE UR QL STRIP: POSITIVE
NRBC BLD AUTO-RTO: 0 /100 WBC (ref 0–0.2)
PH UR STRIP.AUTO: 8.5 [PH] (ref 5–8)
PLATELET # BLD AUTO: 194 10*3/MM3 (ref 140–450)
PMV BLD AUTO: 8.8 FL (ref 6–12)
POTASSIUM SERPL-SCNC: 3.4 MMOL/L (ref 3.5–5.2)
PROT SERPL-MCNC: 6.6 G/DL (ref 6–8.5)
PROT UR QL STRIP: NEGATIVE
RBC # BLD AUTO: 3.97 10*6/MM3 (ref 3.77–5.28)
RBC # UR: ABNORMAL /HPF
REF LAB TEST METHOD: ABNORMAL
SODIUM SERPL-SCNC: 140 MMOL/L (ref 136–145)
SP GR UR STRIP: <1.005 (ref 1–1.03)
SQUAMOUS #/AREA URNS HPF: ABNORMAL /HPF
UROBILINOGEN UR QL STRIP: ABNORMAL
WBC # BLD AUTO: 5.2 10*3/MM3 (ref 3.4–10.8)
WBC UR QL AUTO: ABNORMAL /HPF
WHOLE BLOOD HOLD SPECIMEN: NORMAL
WHOLE BLOOD HOLD SPECIMEN: NORMAL

## 2020-09-23 PROCEDURE — 25010000002 HYDROMORPHONE PER 4 MG: Performed by: EMERGENCY MEDICINE

## 2020-09-23 PROCEDURE — 85025 COMPLETE CBC W/AUTO DIFF WBC: CPT | Performed by: EMERGENCY MEDICINE

## 2020-09-23 PROCEDURE — 81001 URINALYSIS AUTO W/SCOPE: CPT | Performed by: EMERGENCY MEDICINE

## 2020-09-23 PROCEDURE — 99215 OFFICE O/P EST HI 40 MIN: CPT | Performed by: NURSE PRACTITIONER

## 2020-09-23 PROCEDURE — 96365 THER/PROPH/DIAG IV INF INIT: CPT

## 2020-09-23 PROCEDURE — 80053 COMPREHEN METABOLIC PANEL: CPT | Performed by: EMERGENCY MEDICINE

## 2020-09-23 PROCEDURE — 74176 CT ABD & PELVIS W/O CONTRAST: CPT

## 2020-09-23 PROCEDURE — 96375 TX/PRO/DX INJ NEW DRUG ADDON: CPT

## 2020-09-23 PROCEDURE — 25010000002 KETOROLAC TROMETHAMINE PER 15 MG: Performed by: EMERGENCY MEDICINE

## 2020-09-23 PROCEDURE — 25010000002 ONDANSETRON PER 1 MG: Performed by: EMERGENCY MEDICINE

## 2020-09-23 PROCEDURE — 25010000002 CEFTRIAXONE PER 250 MG: Performed by: EMERGENCY MEDICINE

## 2020-09-23 PROCEDURE — 83690 ASSAY OF LIPASE: CPT | Performed by: EMERGENCY MEDICINE

## 2020-09-23 PROCEDURE — 99283 EMERGENCY DEPT VISIT LOW MDM: CPT

## 2020-09-23 RX ORDER — TAMSULOSIN HYDROCHLORIDE 0.4 MG/1
1 CAPSULE ORAL NIGHTLY
Qty: 7 CAPSULE | Refills: 0 | Status: SHIPPED | OUTPATIENT
Start: 2020-09-23 | End: 2021-01-07

## 2020-09-23 RX ORDER — SODIUM CHLORIDE 0.9 % (FLUSH) 0.9 %
10 SYRINGE (ML) INJECTION AS NEEDED
Status: DISCONTINUED | OUTPATIENT
Start: 2020-09-23 | End: 2020-09-24 | Stop reason: HOSPADM

## 2020-09-23 RX ORDER — SULFAMETHOXAZOLE AND TRIMETHOPRIM 800; 160 MG/1; MG/1
1 TABLET ORAL 2 TIMES DAILY
Qty: 14 TABLET | Refills: 0 | Status: SHIPPED | OUTPATIENT
Start: 2020-09-23 | End: 2020-12-14

## 2020-09-23 RX ORDER — HYDROMORPHONE HYDROCHLORIDE 1 MG/ML
0.5 INJECTION, SOLUTION INTRAMUSCULAR; INTRAVENOUS; SUBCUTANEOUS ONCE
Status: COMPLETED | OUTPATIENT
Start: 2020-09-23 | End: 2020-09-23

## 2020-09-23 RX ORDER — HYDROCODONE BITARTRATE AND ACETAMINOPHEN 7.5; 325 MG/1; MG/1
1 TABLET ORAL EVERY 4 HOURS PRN
Qty: 20 TABLET | Refills: 0 | Status: SHIPPED | OUTPATIENT
Start: 2020-09-23 | End: 2021-01-07

## 2020-09-23 RX ORDER — ONDANSETRON 2 MG/ML
4 INJECTION INTRAMUSCULAR; INTRAVENOUS ONCE
Status: COMPLETED | OUTPATIENT
Start: 2020-09-23 | End: 2020-09-23

## 2020-09-23 RX ORDER — ONDANSETRON 4 MG/1
4 TABLET, ORALLY DISINTEGRATING ORAL EVERY 6 HOURS PRN
Qty: 15 TABLET | Refills: 0 | Status: SHIPPED | OUTPATIENT
Start: 2020-09-23 | End: 2021-01-07

## 2020-09-23 RX ORDER — KETOROLAC TROMETHAMINE 30 MG/ML
30 INJECTION, SOLUTION INTRAMUSCULAR; INTRAVENOUS ONCE
Status: COMPLETED | OUTPATIENT
Start: 2020-09-23 | End: 2020-09-23

## 2020-09-23 RX ORDER — CEFTRIAXONE SODIUM 1 G/50ML
1 INJECTION, SOLUTION INTRAVENOUS ONCE
Status: COMPLETED | OUTPATIENT
Start: 2020-09-23 | End: 2020-09-23

## 2020-09-23 RX ADMIN — HYDROMORPHONE HYDROCHLORIDE 0.5 MG: 1 INJECTION, SOLUTION INTRAMUSCULAR; INTRAVENOUS; SUBCUTANEOUS at 20:06

## 2020-09-23 RX ADMIN — ONDANSETRON 4 MG: 2 INJECTION INTRAMUSCULAR; INTRAVENOUS at 20:06

## 2020-09-23 RX ADMIN — CEFTRIAXONE SODIUM 1 G: 1 INJECTION, SOLUTION INTRAVENOUS at 23:17

## 2020-09-23 RX ADMIN — SODIUM CHLORIDE 1000 ML: 9 INJECTION, SOLUTION INTRAVENOUS at 20:06

## 2020-09-23 RX ADMIN — KETOROLAC TROMETHAMINE 30 MG: 30 INJECTION, SOLUTION INTRAMUSCULAR at 20:06

## 2020-09-23 NOTE — ED NOTES
Pt presents to Ed with complaints of R sided flank pain since last night. Pt states the pain has increased throughout today, with hx of kidney stones. Pt states she is still able to urinate at this time. Pt is A&OX4, able to ambulate but placed in a wheelchair, and in a mask at this time.      Ele Barrett, RN  09/23/20 [id number]

## 2020-09-23 NOTE — ED PROVIDER NOTES
EMERGENCY DEPARTMENT ENCOUNTER    Room Number:  10/10  Date of encounter:  9/23/2020  PCP: Provider, No Known  Historian: Patient      HPI:  Chief Complaint: Right flank pain  A complete HPI/ROS/PMH/PSH/SH/FH are unobtainable due to: N/A    Context: Rachelle Corbin is a 54 y.o. female who presents to the ED c/o right flank pain that started this morning about 10:00.  She has some urinary urgency early this morning, however the pain kicked in later on.  She states it comes in waves, it radiates into the right lower quadrant, it is sharp and burning.  It is associated with nausea.  No vomiting.  No fevers or chills.  No diarrhea.  No cough or congestion.  She has a history of a kidney stone in the remote past, but nothing recently.      The patient was placed in a mask in triage, hand hygiene was performed before and after my interaction with the patient.  I wore a mask, safety glasses and gloves during my entire interaction with the patient.    PAST MEDICAL HISTORY  Active Ambulatory Problems     Diagnosis Date Noted   • Recurrent herpes simplex 06/05/2018   • Stress incontinence 06/05/2018   • Breast calcifications 01/15/2020   • Ductal carcinoma in situ (DCIS) of left breast 02/26/2020     Resolved Ambulatory Problems     Diagnosis Date Noted   • No Resolved Ambulatory Problems     Past Medical History:   Diagnosis Date   • Anesthesia complication    • Breast cancer, left (CMS/[facility]) 02/2020   • Cancer (CMS/[facility])    • Environmental and seasonal allergies    • Herpes    • History of iron deficiency anemia 2016   • Kidney stones 2016   • Mammogram abnormal          PAST SURGICAL HISTORY  Past Surgical History:   Procedure Laterality Date   • BREAST BIOPSY Right     Benign   • BREAST BIOPSY Left 2/12/2020    Procedure: BREAST BIOPSY WITH NEEDLE LOCALIZATION;  Surgeon: Harshad Eduardo MD;  Location: [facility] OR;  Service: General;  Laterality: Left;   • BREAST BIOPSY Left 3/4/2020    Procedure: RE EXCISION  ANTERIOR MARGIN OF LEFT BREAST LUMPECTOMY CAVITY;  Surgeon: Harshad Eduardo MD;  Location: [facility] OR;  Service: General;  Laterality: Left;   • BREAST LUMPECTOMY Right     Benign   • ENDOMETRIAL ABLATION  2010   • KIDNEY STONE SURGERY     • LACRIMAL DUCT PROBING W/ STENT PLACEMENT Right    • TONSILLECTOMY AND ADENOIDECTOMY     • VAGINAL HYSTERECTOMY           FAMILY HISTORY  Family History   Problem Relation Age of Onset   • Osteoporosis Mother    • Coronary artery disease Father 48         at 54   • Heart disease Father    • Heart attack Father    • Coronary artery disease Maternal Grandmother         fatal MI at 70   • Osteoporosis Maternal Grandmother    • Coronary artery disease Paternal Grandfather    • Thyroid cancer Sister    • Thyroid disease Sister    • Lung cancer Paternal Uncle    • Other Other         Potter syndrome   • Colon cancer Other    • Diabetes Other    • Breast cancer Neg Hx    • Hypotension Neg Hx    • Hypertension Neg Hx    • Malig Hyperthermia Neg Hx          SOCIAL HISTORY  Social History     Socioeconomic History   • Marital status:      Spouse name: Wayne   • Number of children: 1   • Years of education: Not on file   • Highest education level: Not on file   Occupational History     Employer: SELF-EMPLOYED   Tobacco Use   • Smoking status: Former Smoker     Packs/day: 1.00     Years: 3.00     Pack years: 3.00     Types: Cigarettes     Quit date:      Years since quittin.7   • Smokeless tobacco: Never Used   Substance and Sexual Activity   • Alcohol use: Yes     Alcohol/week: 3.0 standard drinks     Types: 1 Glasses of wine, 1 Cans of beer, 1 Shots of liquor per week   • Drug use: No   • Sexual activity: Defer         ALLERGIES  Adhesive tape        REVIEW OF SYSTEMS  Review of Systems   Constitutional: Negative for fever.   HENT: Negative for sore throat.    Eyes: Negative.    Respiratory: Negative for cough and shortness of breath.     Cardiovascular: Negative for chest pain.   Gastrointestinal: Positive for nausea. Negative for abdominal pain, diarrhea and vomiting.   Genitourinary: Positive for flank pain, frequency and urgency. Negative for dysuria.   Musculoskeletal: Negative for neck pain.   Skin: Negative for rash.   Allergic/Immunologic: Negative.    Neurological: Negative for weakness, numbness and headaches.   Hematological: Negative.    Psychiatric/Behavioral: Negative.    All other systems reviewed and are negative.       All systems reviewed and negative except for those discussed in HPI.       PHYSICAL EXAM    I have reviewed the triage vital signs and nursing notes.    ED Triage Vitals [09/23/20 1839]   Temp Heart Rate Resp BP SpO2   98.2 °F (36.8 °C) 80 20 -- 99 %      Temp src Heart Rate Source Patient Position BP Location FiO2 (%)   Tympanic Monitor -- -- --       Physical Exam   Constitutional: Pt. is oriented to person, place, and time and well-developed, well-nourished.  She appears uncomfortable.   HENT: Normocephalic and atraumatic,  EOM are normal. Pupils are equal, round, and reactive to light. Oropharynx moist/nonerythematous.  Neck: Normal range of motion. Neck supple. No JVD present. No tracheal deviation present. No thyromegaly present.   Cardiovascular: Normal rate, regular rhythm and normal heart sounds. Exam reveals no gallop and no friction rub.   No murmur heard.  Pulmonary/Chest: Effort normal and breath sounds normal. No stridor. No respiratory distress. No wheezes, no rales.   Abdominal: Soft. Bowel sounds are normal. No distension. There is moderate right lower quadrant and right flank tenderness. There is no rebound and no guarding.   Musculoskeletal: Normal range of motion. No edema, tenderness or deformity.   Neurological: Pt. is alert and oriented to person, place, and time. Pt. has normal sensation and normal strength. No cranial nerve deficit. GCS score is 15.   Skin: Skin is warm and dry. No rash  noted. Pt. is not diaphoretic. No erythema.   Psychiatric: Mood, affect and judgment normal.   Nursing note and vitals reviewed.        LAB RESULTS  Recent Results (from the past 24 hour(s))   Urinalysis With Microscopic If Indicated (No Culture) - Urine, Clean Catch    Collection Time: 09/23/20  7:26 PM    Specimen: Urine, Clean Catch   Result Value Ref Range    Color, UA Dark Yellow (A) Yellow, Straw    Appearance, UA Clear Clear    pH, UA 8.5 (H) 5.0 - 8.0    Specific Gravity, UA <1.005 (L) 1.005 - 1.030    Glucose, UA Negative Negative    Ketones, UA Negative Negative    Bilirubin, UA Negative Negative    Blood, UA Negative Negative    Protein, UA Negative Negative    Leuk Esterase, UA Negative Negative    Nitrite, UA Positive (A) Negative    Urobilinogen, UA 0.2 E.U./dL 0.2 - 1.0 E.U./dL   Urinalysis, Microscopic Only - Urine, Clean Catch    Collection Time: 09/23/20  7:26 PM    Specimen: Urine, Clean Catch   Result Value Ref Range    RBC, UA 0-2 None Seen, 0-2 /HPF    WBC, UA 0-2 None Seen, 0-2 /HPF    Bacteria, UA 1+ (A) None Seen /HPF    Squamous Epithelial Cells, UA None Seen None Seen, 0-2 /HPF    Hyaline Casts, UA 0-2 None Seen /LPF    Methodology Manual Light Microscopy    Comprehensive Metabolic Panel    Collection Time: 09/23/20  8:05 PM    Specimen: Blood   Result Value Ref Range    Glucose 82 65 - 99 mg/dL    BUN 13 6 - 20 mg/dL    Creatinine 0.83 0.57 - 1.00 mg/dL    Sodium 140 136 - 145 mmol/L    Potassium 3.4 (L) 3.5 - 5.2 mmol/L    Chloride 107 98 - 107 mmol/L    CO2 27.0 22.0 - 29.0 mmol/L    Calcium 9.1 8.6 - 10.5 mg/dL    Total Protein 6.6 6.0 - 8.5 g/dL    Albumin 4.00 3.50 - 5.20 g/dL    ALT (SGPT) 14 1 - 33 U/L    AST (SGOT) 15 1 - 32 U/L    Alkaline Phosphatase 104 39 - 117 U/L    Total Bilirubin 0.3 0.0 - 1.2 mg/dL    eGFR Non African Amer 71 >60 mL/min/1.73    Globulin 2.6 gm/dL    A/G Ratio 1.5 g/dL    BUN/Creatinine Ratio 15.7 7.0 - 25.0    Anion Gap 6.0 5.0 - 15.0 mmol/L   Lipase     Collection Time: 09/23/20  8:05 PM    Specimen: Blood   Result Value Ref Range    Lipase 31 13 - 60 U/L   Light Blue Top    Collection Time: 09/23/20  8:05 PM   Result Value Ref Range    Extra Tube hold for add-on    Green Top (Gel)    Collection Time: 09/23/20  8:05 PM   Result Value Ref Range    Extra Tube Hold for add-ons.    Lavender Top    Collection Time: 09/23/20  8:05 PM   Result Value Ref Range    Extra Tube hold for add-on    Gold Top - SST    Collection Time: 09/23/20  8:05 PM   Result Value Ref Range    Extra Tube Hold for add-ons.    CBC Auto Differential    Collection Time: 09/23/20  8:05 PM    Specimen: Blood   Result Value Ref Range    WBC 5.20 3.40 - 10.80 10*3/mm3    RBC 3.97 3.77 - 5.28 10*6/mm3    Hemoglobin 12.5 12.0 - 15.9 g/dL    Hematocrit 35.5 34.0 - 46.6 %    MCV 89.4 79.0 - 97.0 fL    MCH 31.5 26.6 - 33.0 pg    MCHC 35.2 31.5 - 35.7 g/dL    RDW 12.0 (L) 12.3 - 15.4 %    RDW-SD 38.6 37.0 - 54.0 fl    MPV 8.8 6.0 - 12.0 fL    Platelets 194 140 - 450 10*3/mm3    Neutrophil % 62.3 42.7 - 76.0 %    Lymphocyte % 24.2 19.6 - 45.3 %    Monocyte % 8.3 5.0 - 12.0 %    Eosinophil % 3.8 0.3 - 6.2 %    Basophil % 1.0 0.0 - 1.5 %    Immature Grans % 0.4 0.0 - 0.5 %    Neutrophils, Absolute 3.24 1.70 - 7.00 10*3/mm3    Lymphocytes, Absolute 1.26 0.70 - 3.10 10*3/mm3    Monocytes, Absolute 0.43 0.10 - 0.90 10*3/mm3    Eosinophils, Absolute 0.20 0.00 - 0.40 10*3/mm3    Basophils, Absolute 0.05 0.00 - 0.20 10*3/mm3    Immature Grans, Absolute 0.02 0.00 - 0.05 10*3/mm3    nRBC 0.0 0.0 - 0.2 /100 WBC       Ordered the above labs and independently reviewed the results.        RADIOLOGY  Ct Abdomen Pelvis Without Contrast    Result Date: 9/23/2020  CT ABDOMEN PELVIS WO CONTRAST-  INDICATIONS: Right renal colic  TECHNIQUE: Radiation dose reduction techniques were utilized, including automated exposure control and exposure modulation based on body size. Unenhanced ABDOMEN AND PELVIS CT  COMPARISON: 12/07/2002   FINDINGS:  Nonobstructive stones are seen in the right kidney, as large as 3-4 mm. A 5 to 6 mm stone is seen at the right vesicoureteral junction with obstructive effect, mild right hydronephrosis.  Hepatic cyst is noted. Subtle tapering density in the gallbladder could be evidence of sludge or layering stones.  Otherwise unremarkable unenhanced appearance of the liver, spleen, adrenal glands, pancreas, kidneys, bladder.  No bowel obstruction or abnormal bowel thickening is identified.  No free intraperitoneal gas or free fluid.  Scattered small mesenteric and para-aortic lymph nodes are seen that are not significant by size criteria.  Abdominal aorta is not aneurysmal.  The lung bases are clear.  Degenerative changes are seen in the spine. No acute fracture is identified.         A 5 to 6 mm stone at the right vesicoureteral junction with mild right hydronephrosis. Small nonobstructive stones in the right kidney. No left hydronephrosis. 2. No acute inflammatory process of bowel is identified.  Discussed by telephone with nurse Nava, for Dr. Garner, at 2040, 09/23/2020  This report was finalized on 9/23/2020 8:41 PM by Dr. Medardo Granger M.D.        I ordered the above noted radiological studies. Reviewed by me and discussed with radiologist.  See dictation for official radiology interpretation.      PROCEDURES    Procedures      MEDICATIONS GIVEN IN ER    Medications   sodium chloride 0.9 % flush 10 mL (has no administration in time range)   cefTRIAXone (ROCEPHIN) IVPB 1 g (has no administration in time range)   sodium chloride 0.9 % bolus 1,000 mL (1,000 mL Intravenous New Bag 9/23/20 2006)   ketorolac (TORADOL) injection 30 mg (30 mg Intravenous Given 9/23/20 2006)   HYDROmorphone (DILAUDID) injection 0.5 mg (0.5 mg Intravenous Given 9/23/20 2006)   ondansetron (ZOFRAN) injection 4 mg (4 mg Intravenous Given 9/23/20 2006)         PROGRESS, DATA ANALYSIS, CONSULTS, AND MEDICAL DECISION MAKING    Any/all  labs have been independently reviewed by me.  Any/all radiology studies have been reviewed by me and discussed with radiologist dictating the report.   EKG's independently viewed and interpreted by me.  Discussion below represents my analysis of pertinent findings related to patient's condition, differential diagnosis, treatment plan and final disposition.      ED Course as of Sep 23 2316   Wed Sep 23, 2020   2105 Given the several unrest in [facility], all 24-hour pharmacies are closed.  She will be given a dose of IV antibiotics here prior to discharge.  She is comfortable-I suspect she will be able to go home, but I want to ensure adequate pain control before she leaves.    [WC]      ED Course User Index  [WC] Harshad Garner MD       AS OF 23:16 EDT VITALS:    BP -    HR - 80  TEMP - 98.2 °F (36.8 °C) (Tympanic)  02 SATS - 99%        DIAGNOSIS  Final diagnoses:   Right ureteral stone         DISPOSITION  Discharged           Harshad Garner MD  09/23/20 0761

## 2020-09-23 NOTE — PROGRESS NOTES
[facility] MULTIDISCIPLINARY CLINIC  SURVIVORSHIP TELEHEALTH VISIT    Rachelle Corbin is a pleasant 54 y.o. female being followed by Tim Elam MD  for DCIS of the left breast. Being reviewed today by VIDEO to review survivorship care plan. Her spouse Wayne was present for our conversation    Patient consent obtained for our telehealth visit today.    CADENCE Bush is a 54-year-old female who completed left breast lumpectomy for DCIS followed by adjuvant radiation completed May this year.  She reports several concerns today.  She feels she is still recovering from a pretty significant left breast radiation dermatitis.  The skin is healed however  with episodes of itching.  She has been having hot flashes since beginning tamoxifen.  She is having difficulty with sleep.  Overall she expresses worry about not quite feeling herself since completing treatment, and is anxious about the timing of her next mammogram and worried that it is too far out from her treatment.    TREATMENT HISTORY:     Oncology/Hematology History   Ductal carcinoma in situ (DCIS) of left breast   2/12/2020 Surgery    1.  Left Breast, Needle-Localized Excisional Biopsy (33 grams):                A.  INTERMEDIATE GRADE DUCTAL CARCINOMA IN SITU (DCIS):                             1.  Solid and Cribriform types with associated necrosis and calcifications.                             2.  Extent of DCIS: 18 mm.                            3.  Margins are negative for in situ carcinoma; Closest distance:  DCIS is present 0.2 mm from the                                 anterior margin.                B.  Separate focus of atypical ductal hyperplasia with associated calcifications.               C.  Background breast parenchyma with scattered microcysts and associated calcifications.                D.  See Synoptic Report and Comments.     PTis pNX    Estrogen receptor: Positive at %  Progesterone receptor: Positive at  21-30%     2/12/2020 Initial Diagnosis    Ductal carcinoma in situ (DCIS) of left breast     3/4/2020 Surgery    1. Breast, Left, New Anterior Margin, Excision: Benign skeletal muscle, adipose and fibrous tissue and breast tissue with                A. Inflammation.               B. Biopsy site changes.               C. Margins free of atypia, in situ and invasive disease.     2. Breast, Left, Additional Anterior Margin, Excision: Benign adipose tissue with                A. Fat necrosis.               B. Margins free of atypia, in situ and invasive disease.     Comment: No residual ductal carcinoma in situ is identified.  The first part added an additional 8 mm of anterior margin and the second part an additional 2 mm of anterior margin for a total of 10 mm of additional benign anterior margin.          4/13/2020 - 5/4/2020 Radiation    Radiation OncologyTreatment Course:  Rachelle Corbin received 4256 cGy in 16 fractions to LEFT breast.     5/5/2020 -  Hormonal Therapy    Tamoxifen 20 mg daily         Allergies as of 09/23/2020 - Reviewed 09/23/2020   Allergen Reaction Noted   • Adhesive tape Hives and Rash 03/03/2020       MEDICATIONS:  I have reviewed the medication list with the patient and I updated it in the electronic medical record. Medication dosages and frequencies were confirmed to be accurate with the patient.      Review of Systems   Constitutional: Positive for activity change and fatigue. Negative for appetite change and unexpected weight change.        Positive for hot flashes   Respiratory: Negative for chest tightness and shortness of breath.    Cardiovascular: Negative for chest pain and leg swelling.   Gastrointestinal: Negative for constipation and diarrhea.   Genitourinary:        Frequent UTIs   Musculoskeletal: Negative for arthralgias and myalgias.   Skin:        Itching at left breast radiation treatment site   Neurological: Negative for dizziness and light-headedness.    Psychiatric/Behavioral: Positive for decreased concentration and sleep disturbance. Negative for dysphoric mood. The patient is nervous/anxious.          Wt Readings from Last 3 Encounters:   07/25/20 81.6 kg (180 lb)   06/15/20 82.7 kg (182 lb 4.8 oz)   03/04/20 81.2 kg (179 lb 0.2 oz)       Pain Score    09/23/20 1018   PainSc: 0-No pain         PHYSICAL EXAM DEFERRED      Problem List Items Addressed This Visit        Active Problems    Ductal carcinoma in situ (DCIS) of left breast - Primary    Relevant Orders    Ambulatory Referral to Lymphedema Clinic            SURVIVORSHIP DISCUSSION HELD TODAY:   Problems identified:  1. Pruritus: Continues to have significant itching at left breast radiation treatment site.  Has been applying topical hydrocortisone as needed with minimal results.  We discussed moisturizing in the area with vitamin E and cocoa butter and gentle massage to the area.  Advised this should continue to improve with time.  We discussed an antihistamine such as Benadryl at bedtime which may assist with itching as well as sleep.  2. Lymphedema: We reviewed lymphedema causes, early signs and symptoms, prevention and management.  We reviewed that her risk of lymphedema is minimal and she had no sentinel node procedure performed.  We did discuss that radiation treatment may confer some risk.  She is reporting the left breast feeling swollen and tender with some tightness around the area of surgical scar and discomfort when wearing a bra.  We will have this evaluated in the breast care clinic.  3. Tamoxifen: Patient is reporting some distress associated with hot flashes and mood changes.  She feels her sleep also has been irregular reporting only 1 hour sleep last night.  We discussed pharmacologic options for management of vasovagal symptoms including gabapentin and Effexor and I have encouraged her to discuss these options with Dr. Elam.  Additionally we discussed there is some limited evidence  supporting acupuncture for management of hot flashes and menopausal symptoms.  We reviewed she should be seen by gynecology at least annually while on tamoxifen.  We reviewed early signs and symptoms of clotting event to present for immediate evaluation.  4. Surveillance: Patient with significant anxiety around interval of time until next mammogram.  This is been set up for February.  We discussed typical interval of 6 to 12 months post adjuvant radiation before first posttreatment mammogram to allow post radiation inflammatory changes the opportunity to heal.  We reviewed the importance of continued annual mammography in combination with annual breast exam with H&P and routine breast self-exam.  5. Fatigue and decreased stamina: She had been working with a  prior to the pandemic.  She has not been able to resume that level of activity since her diagnosis.  She did try to return to the  but found herself more deconditioned than anticipated.  Offered reassurance that with consistent regular exercise she should gradually be able to increase the duration and intensity of her workouts.  We discussed that for now simply building a routine around walking most days of the week would be optimal.  We also discussed opportunities for structured exercise classes available at [place].  6. Frequent UTIs and muscle cramping.  She is reporting frequent UTIs with cramps occasionally in hands feet calves.  We reviewed eight 8 ounce glasses of water per day.  We discussed dietary sources of potassium and addition of vitamin D supplement.  She does not currently have a primary care provider.  I provided her with the [facility] primary care referral line phone number.  In the meantime I have asked her to discuss these with Dr. Elam at her next follow-up if not resolved after self-management interventions.  7. Psychosocial: Patient reports spouse is been a good source of support.  He is present  throughout her video visit today.  She is frustrated and reports feeling guilty about not feeling quite herself yet.  Much time in discussion spent normalizing these experiences.      Referral recommendations:  1. Referral to lymphedema clinic for evaluation of left breast discomfort, tenderness and swollen feeling as well as recommendations for continued physical activity.  2. I have asked the patient to discuss pharmacologic management of hot flashes with Dr. Elam.  I have also provided her referrals to [place] [facility] and [facility] acupuncture  3. [employer] for social support and physical activity classes  4. [facility] primary care referral line to connect with a primary care provider  5. Will email patient advanced directive and living will information packet and registration link for upcoming classes.  6. After our discussion today she reports less distress.  I have asked her to please call my office with any additional questions or needs for information or resources.    Advance Care Planning   Advance Care Planning Discussion:    Patient does not have advance care planning complete.  She and her spouse have considered this in the past but have not yet completed.  She is interested in additional information that I will email.    Brief discussion and information provided regarding advance care planning and appropriateness for all healthy adults, choosing a healthcare surrogate, prior experiences with loved ones who have been seriously ill, exploration of goals of care in the event of a sudden injury or illness, and upcoming Advance Care Planning classes offered [facility].           Discussed NCCN recommendations for all cancer survivors of 150 minutes/week moderate intensity exercise, achieve and maintain a healthy BMI, plants-based whole-foods diet, avoid tobacco and second hand smoke, avoid alcohol or minimize alcohol intake - no more than 1 drink in a day  for women, 2 drinks in a day for men.     Orders Placed This Encounter   Procedures   • Ambulatory Referral to Lymphedema Clinic     Referral Priority:   Routine     Referral Type:   Therapy     Number of Visits Requested:   1       After review of the Survivorship Treatment Summary & Care Plan, the patient verbalized understanding of recommendations for follow-up. As outlined in the care plan, they were advised to continue with follow-up care in accordance with the NCCN surveillance guidelines while transitioning back to their primary care physician for continued general preventive and healthcare needs. We discussed the importance of healthy eating, exercise and weight management. We reviewed current guidelines for routine screening of other cancers.     A copy of the Survivorship Treatment Summary & Care Plan for Ms. Corbin (see below) was provided to and forwarded to the providers identified on the care team.      Greater than 40 minutes spent with patient, 40 minutes of that spent counseling patient by TELEHEALTH one to one on treatment summary, surveillance for recurrence, late and long-term effects of disease and treatment, intimacy and self-image, preventative screening for other cancers, achieving/maintaining healthy BMI and link to risk reduction, adherence to current therapies, management of anxiety/uncertainty and self care strategies.

## 2020-09-25 ENCOUNTER — HOSPITAL ENCOUNTER (OUTPATIENT)
Dept: PHYSICAL THERAPY | Facility: HOSPITAL | Age: 55
Setting detail: THERAPIES SERIES
Discharge: HOME | End: 2020-09-25

## 2020-09-25 DIAGNOSIS — N64.4 BREAST PAIN, LEFT: Primary | ICD-10-CM

## 2020-09-25 DIAGNOSIS — D05.12 DUCTAL CARCINOMA IN SITU (DCIS) OF LEFT BREAST: ICD-10-CM

## 2020-09-25 DIAGNOSIS — M25.612 DECREASED ROM OF LEFT SHOULDER: ICD-10-CM

## 2020-09-25 PROCEDURE — 97110 THERAPEUTIC EXERCISES: CPT

## 2020-09-25 PROCEDURE — 97161 PT EVAL LOW COMPLEX 20 MIN: CPT

## 2020-09-25 PROCEDURE — 97535 SELF CARE MNGMENT TRAINING: CPT

## 2020-09-25 NOTE — THERAPY EVALUATION
"    Physical Therapy Lymphedema Initial Evaluation  [facility]     Patient Name: Rachelle Corbin  : 1965  MRN: [id number]  Today's Date: 2020      Visit Date: 2020    Visit Dx:    ICD-10-CM ICD-9-CM   1. Breast pain, left  N64.4 611.71   2. Decreased ROM of left shoulder  M25.612 719.51       Patient Active Problem List   Diagnosis   • Recurrent herpes simplex   • Stress incontinence   • Breast calcifications   • Ductal carcinoma in situ (DCIS) of left breast        Past Medical History:   Diagnosis Date   • Anesthesia complication     \"I JUST CAN'T COME OUT OF IT.\"   • Breast cancer, left (CMS/Formerly [facility]) 2020   • Cancer (CMS/Formerly [facility])     SKIN CANCER-FOREHEAD SQUAMOUS CELL    • Environmental and seasonal allergies    • Herpes    • History of iron deficiency anemia 2016    Menorrhagia prior to hysterectomy   • Kidney stones 2016   • Mammogram abnormal     LEFT   • Stress incontinence         Past Surgical History:   Procedure Laterality Date   • BREAST BIOPSY Right     Benign   • BREAST BIOPSY Left 2020    Procedure: BREAST BIOPSY WITH NEEDLE LOCALIZATION;  Surgeon: Harshad Eduardo MD;  Location: [facility]  Service: General;  Laterality: Left;   • BREAST BIOPSY Left 3/4/2020    Procedure: RE EXCISION ANTERIOR MARGIN OF LEFT BREAST LUMPECTOMY CAVITY;  Surgeon: Harshad Eduardo MD;  Location: [facility]  Service: General;  Laterality: Left;   • BREAST LUMPECTOMY Right     Benign   • ENDOMETRIAL ABLATION  2010   • KIDNEY STONE SURGERY     • LACRIMAL DUCT PROBING W/ STENT PLACEMENT Right    • TONSILLECTOMY AND ADENOIDECTOMY     • VAGINAL HYSTERECTOMY  2016       Visit Dx:    ICD-10-CM ICD-9-CM   1. Breast pain, left  N64.4 611.71   2. Decreased ROM of left shoulder  M25.612 719.51       Patient History     Row Name 20 1100             History    Chief Complaint  Pain  -LB      Type of Pain  Shoulder pain breast  -LB      Date Current Problem(s) Began  20 " re-excision3/4/20  -LB      Brief Description of Current Complaint  Pt s/p L lumpectomy without LN removal 2/12/20 with re-excision 3/4/20. She presents today with L breast edema and occasional pain. She also does not have full ROM of L shoulder. She has returned to work. She lives with her  and is able to perform ADLs. She recently has had issues with kidney stones.    -LB      Previous treatment for THIS PROBLEM  Surgery  -LB      Surgery Date:  03/04/20  -LB      Patient/Caregiver Goals  Relieve pain;Return to prior level of function;Know what to do to help the symptoms  -LB      Hand Dominance  right-handed  -LB      Occupation/sports/leisure activities  Pt works as a . 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  -LB      Patient seeing anyone else for problem(s)?  yes  -LB      How has patient tried to help current problem?  lotion occasionally  -LB      History of Previous Related Injuries  none  -LB         Pain     Pain Location  Breast;Arm  -LB      Pain at Present  2  -LB      Pain at Best  0  -LB      Pain at Worst  4  -LB      Pain Frequency  Intermittent  -LB      Pain Description  Stabbing;Tightness;Sore  -LB      What Performance Factors Make the Current Problem(s) WORSE?  moving LUE  -LB      What Performance Factors Make the Current Problem(s) BETTER?  rest  -LB      Is your sleep disturbed?  No  -LB      Difficulties at work?  no  -LB      Difficulties with ADL's?  no  -LB      Difficulties with recreational activities?  unable to exercise with  due to fatigue  -LB         Fall Risk Assessment    Any falls in the past year:  No  -LB         Services    Prior Rehab/Home Health Experiences  No  -LB      Are you currently receiving Home Health services  No  -LB      Do you plan to receive Home Health services in the near future  No  -LB         Daily Activities    Primary Language  English  -LB      Pt Participated in POC and Goals  Yes  -LB         Safety    Are you being hurt,  hit, or frightened by anyone at home or in your life?  No  -LB      Are you being neglected by a caregiver  No  -LB        User Key  (r) = Recorded By, (t) = Taken By, (c) = Cosigned By    Initials Name Provider Type    Mindy Rg, PT Physical Therapist          Lymphedema     Row Name 09/25/20 1100             Subjective Pain    Able to rate subjective pain?  yes  -LB      Pre-Treatment Pain Level  2  -LB         Subjective Comments    Subjective Comments  I am doing ok. Really it isn't that bad. I think I just have to deal with some of these things. I can't regulate my temperature. I can't work out with my . I get pain and swelling in my L breast sometimes but it goes away.  -LB         Lymphedema Assessment    Lymphedema Classification  at risk/stage 0  -LB      Lymphedema Cancer Related Sx  left;lumpectomy  -LB      Lymphedema Surgery Comments  without LN removal  -LB      Lymph Nodes Removed #  0  -LB      Chemo Received  no  -LB      Radiation Therapy Received  yes  -LB      Radiation Treatments #/Timeframe  completed  -LB      Adverse Radiation Reactions/Complication  severe burning/blistering; hx of dermatitis following biopsy  -LB      Infections or Cellulitis?  no  -LB         Posture/Observations    Posture/Observations Comments  forward head, rounded shoulders, thickening at L breast incision  -LB         General ROM    GENERAL ROM COMMENTS  LUE grossly 130 deg flexion/abduction, FIR to L4, FIR to back of neck with cervical flexion compensation  -LB         Lymphedema Edema Assessment    Edema Assessment Comment  no obvious edema today  -LB         Skin Changes/Observations    Skin Observations Comment  well healing incision; scar tissue present  -LB         Lymphedema Sensation    Lymphedema Sensation Comments  WNL  -LB         Lymphedema Pulses/Capillary Refill    Lymph Pulses Capillary Refill Comments  WNL  -LB         Manual Lymphatic Drainage    Manual Therapy  instructed and  demonstrated scar massage  -LB         Compression/Skin Care    Compression/Skin Care Comments  discussed compression tank top, bra fitting  -LB        User Key  (r) = Recorded By, (t) = Taken By, (c) = Cosigned By    Initials Name Provider Type    LB Mindy May, PT Physical Therapist                          Therapy Education  Education Details: discussed signs and symptoms of lymphedema, instructed in scar massage, slow return to exercise, pacing activity, bra fitting, discussed compression tank top  Given: Symptoms/condition management, HEP, Edema management, Posture/body mechanics  Program: New  How Provided: Verbal, Demonstration, Written  Provided to: Patient  Level of Understanding: Teach back education performed, Verbalized, Demonstrated  38838 - PT Self Care/Mgmt Minutes: 15      OP Exercises     Row Name 09/25/20 1100             Subjective Comments    Subjective Comments  I am doing ok. Really it isn't that bad. I think I just have to deal with some of these things. I can't regulate my temperature. I can't work out with my . I get pain and swelling in my L breast sometimes but it goes away.  -LB         Subjective Pain    Able to rate subjective pain?  yes  -LB      Pre-Treatment Pain Level  2  -LB         Total Minutes    96648 - PT Therapeutic Exercise Minutes  15  -LB         Exercise 1    Exercise Name 1  shoulder rolls  -LB      Reps 1  10  -LB         Exercise 2    Exercise Name 2  scap retraction  -LB      Reps 2  10  -LB      Time 2  5  -LB         Exercise 3    Exercise Name 3  supine AAROM flexion self assist  -LB      Reps 3  10  -LB         Exercise 4    Exercise Name 4  supine AAROM with dowel  -LB      Reps 4  10  -LB         Exercise 5    Exercise Name 5  wall wash  -LB      Reps 5  10  -LB         Exercise 6    Exercise Name 6  seated butterfly stretch  -LB      Reps 6  3  -LB      Time 6  15  -LB        User Key  (r) = Recorded By, (t) = Taken By, (c) = Cosigned By     Initials Name Provider Type    LB Mindy May, PT Physical Therapist                      PT OP Goals     Row Name 09/25/20 1100          PT Short Term Goals    STG Date to Achieve  10/09/20  -LB     STG 1  Pt will demonstrate compliance with compression tank top acquisition and wear schedule.  -LB     STG 1 Progress  New  -LB     STG 2  Pt will demonstrate compliance with self scar massage and post-radiation skin care.  -LB     STG 2 Progress  New  -LB        Long Term Goals    LTG Date to Achieve  12/24/20  -LB     LTG 1  Pt will demonstrate full AROM LUE flexion/abduction to 160 deg or better and FIR to L1.  -LB     LTG 1 Progress  New  -LB     LTG 2  Pt will report 0 episodes of stabbing pain in L breast for last 2 weeks.  -LB     LTG 2 Progress  New  -LB     LTG 3  Pt will acquire well fitting and supportive bra to reduce her L breast discomfort.  -LB     LTG 3 Progress  New  -LB        Time Calculation    PT Goal Re-Cert Due Date  12/24/20  -LB       User Key  (r) = Recorded By, (t) = Taken By, (c) = Cosigned By    Initials Name Provider Type    LB Mindy May, PT Physical Therapist          PT Assessment/Plan     Row Name 09/25/20 1143          PT Assessment    Functional Limitations  Performance in self-care ADL;Limitations in functional capacity and performance  -LB     Impairments  Edema;Impaired flexibility;Impaired lymphatic circulation;Joint mobility;Pain;Poor body mechanics;Posture;Range of motion  -LB     Assessment Comments  Pt is 55 yo female s/p L lumpectomy/re-excision 3/4/20 without LN involvement. She presents today with reports of intermittent stabbing pain in L breast with occasional edema and dec LUE AROM. Scar tissue/thickening noted along L breast incision. Pt wearning bra today without support. She has not performed any UE stretching or exercises and wishes to get back to working out with  but feels too fatigued when she attempts it now. She has completed radiation  therapy. Her self reported QuickDash disability score is 5% disability, however, she is R hand dominant and reports performing most activities with RUE. She is appropriate for continued skilled PT services to improve LUE AROM, reduce L breast edema/pain and educate pt in appropriate return to exercise at a pace she is able to maintain.  -LB     Please refer to paper survey for additional self-reported information  Yes  -LB     Rehab Potential  Good  -LB     Patient/caregiver participated in establishment of treatment plan and goals  Yes  -LB     Patient would benefit from skilled therapy intervention  Yes  -LB        PT Plan    PT Frequency  1x/week  -LB     Predicted Duration of Therapy Intervention (OT)  3-4 visits  -LB     Planned CPT's?  PT EVAL LOW COMPLEXITY: 18491;PT THER PROC EA 15 MIN: 47900;PT RE-EVAL: 08306;PT THER ACT EA 15 MIN: 85938;PT MANUAL THERAPY EA 15 MIN: 48518;PT NEUROMUSC RE-EDUCATION EA 15 MIN: 76688;PT GAIT TRAINING EA 15 MIN: 75414;PT SELF CARE/HOME MGMT/TRAIN EA 15: 15401;PT BIS XTRACELL FLUID ANALYSIS: 55951  -LB     PT Plan Comments  Assess LUE AROM, begin strengthening when ROM improves, assist with return to exercise/cardio activity, follow up concerning compression tank/breast edema while using, bra fitting?  -LB       User Key  (r) = Recorded By, (t) = Taken By, (c) = Cosigned By    Initials Name Provider Type    Mindy Rg, PT Physical Therapist             Outcome Measure Options: Quick DASH  Quick DASH  Open a tight or new jar.: No Difficulty  Do heavy household chores (e.g., wash walls, wash floors): No Difficulty  Carry a shopping bag or briefcase: Mild Difficulty  Wash your back: No Difficulty  Use a knife to cut food: No Difficulty  Recreational activities in which you take some force or impact through your arm, should or hand (e.g. golf, hammering, tennis, etc.): Mild Difficulty  During the past week, to what extent has your arm, shoulder, or hand problem interfered with  your normal social activites with family, friends, neighbors or groups?: Not at all  During the past week, were you limited in your work or other regular daily activities as a result of your arm, shoulder or hand problem?: Not limited at all  Arm, Shoulder, or hand pain: None  Tingling (pins and needles) in your arm, shoulder, or hand: None  During the past week, how much difficulty have you had sleeping because of the pain in your arm, shoulder or hand?: No difficulty  Number of Questions Answered: 11  Quick DASH Score: 4.55  Quick Dash Comments: 5% disability         Time Calculation:   Start Time: 1045  Stop Time: 1130  Time Calculation (min): 45 min  Total Timed Code Minutes- PT: 42 minute(s)   Therapy Charges for Today     Code Description Service Date Service Provider Modifiers Qty    79690129235 HC PT THER PROC EA 15 MIN 9/25/2020 Mindy May, PT GP 1    85485454816 HC PT SELF CARE/MGMT/TRAIN EA 15 MIN 9/25/2020 Mindy May, PT GP 1    23027380280 HC PT EVAL LOW COMPLEXITY 1 9/25/2020 Mindy May, PT GP 1          PT G-Codes  Outcome Measure Options: Quick DASH  Quick DASH Score: 4.55         Mindy May PT  9/25/2020

## 2020-12-11 ENCOUNTER — APPOINTMENT (OUTPATIENT)
Dept: LAB | Facility: HOSPITAL | Age: 55
End: 2020-12-11

## 2020-12-14 ENCOUNTER — LAB (OUTPATIENT)
Dept: LAB | Facility: HOSPITAL | Age: 55
End: 2020-12-14

## 2020-12-14 ENCOUNTER — OFFICE VISIT (OUTPATIENT)
Dept: ONCOLOGY | Facility: CLINIC | Age: 55
End: 2020-12-14

## 2020-12-14 VITALS
BODY MASS INDEX: 29.66 KG/M2 | HEART RATE: 87 BPM | TEMPERATURE: 98.2 F | RESPIRATION RATE: 16 BRPM | SYSTOLIC BLOOD PRESSURE: 121 MMHG | WEIGHT: 195.7 LBS | DIASTOLIC BLOOD PRESSURE: 86 MMHG | OXYGEN SATURATION: 97 % | HEIGHT: 68 IN

## 2020-12-14 DIAGNOSIS — D05.12 DUCTAL CARCINOMA IN SITU (DCIS) OF LEFT BREAST: Primary | ICD-10-CM

## 2020-12-14 DIAGNOSIS — F32.A DEPRESSION, UNSPECIFIED DEPRESSION TYPE: ICD-10-CM

## 2020-12-14 LAB
BASOPHILS # BLD AUTO: 0.08 10*3/MM3 (ref 0–0.2)
BASOPHILS NFR BLD AUTO: 1.3 % (ref 0–1.5)
DEPRECATED RDW RBC AUTO: 40 FL (ref 37–54)
EOSINOPHIL # BLD AUTO: 0.18 10*3/MM3 (ref 0–0.4)
EOSINOPHIL NFR BLD AUTO: 2.8 % (ref 0.3–6.2)
ERYTHROCYTE [DISTWIDTH] IN BLOOD BY AUTOMATED COUNT: 12 % (ref 12.3–15.4)
HCT VFR BLD AUTO: 38.1 % (ref 34–46.6)
HGB BLD-MCNC: 13.1 G/DL (ref 12–15.9)
IMM GRANULOCYTES # BLD AUTO: 0.06 10*3/MM3 (ref 0–0.05)
IMM GRANULOCYTES NFR BLD AUTO: 0.9 % (ref 0–0.5)
LYMPHOCYTES # BLD AUTO: 1.52 10*3/MM3 (ref 0.7–3.1)
LYMPHOCYTES NFR BLD AUTO: 24 % (ref 19.6–45.3)
MCH RBC QN AUTO: 31.4 PG (ref 26.6–33)
MCHC RBC AUTO-ENTMCNC: 34.4 G/DL (ref 31.5–35.7)
MCV RBC AUTO: 91.4 FL (ref 79–97)
MONOCYTES # BLD AUTO: 0.48 10*3/MM3 (ref 0.1–0.9)
MONOCYTES NFR BLD AUTO: 7.6 % (ref 5–12)
NEUTROPHILS NFR BLD AUTO: 4.02 10*3/MM3 (ref 1.7–7)
NEUTROPHILS NFR BLD AUTO: 63.4 % (ref 42.7–76)
NRBC BLD AUTO-RTO: 0 /100 WBC (ref 0–0.2)
PLATELET # BLD AUTO: 202 10*3/MM3 (ref 140–450)
PMV BLD AUTO: 9.3 FL (ref 6–12)
RBC # BLD AUTO: 4.17 10*6/MM3 (ref 3.77–5.28)
WBC # BLD AUTO: 6.34 10*3/MM3 (ref 3.4–10.8)

## 2020-12-14 PROCEDURE — 85025 COMPLETE CBC W/AUTO DIFF WBC: CPT

## 2020-12-14 PROCEDURE — 36415 COLL VENOUS BLD VENIPUNCTURE: CPT

## 2020-12-14 PROCEDURE — 99214 OFFICE O/P EST MOD 30 MIN: CPT | Performed by: NURSE PRACTITIONER

## 2020-12-14 NOTE — PROGRESS NOTES
Subjective     REASON FOR FOLLOW-UP:  DCIS of the left breast  Provide an opinion on any further workup or treatment                             REQUESTING PHYSICIAN:  Dr. Eduardo    RECORDS OBTAINED:  Records of the patients history including those obtained from the referring provider were reviewed and summarized in detail.      History of Present Illness   This is a pleasant 55-year-old woman seen today in follow up for diagnosis of ductal carcinoma in situ of the left breast.  The patient had an abnormal screening mammography performed 11/22/2019 showing abnormal calcifications in the upper outer quadrant of the left breast.  A stereotactic guided left biopsy was attempted but unable to be performed secondary to the location and faint character of the calcifications in the posterior one third upper outer quadrant of the left breast.  She was referred to surgery and underwent a needle localization excisional biopsy of the left breast on 2/12/2020 which showed intermediate grade ductal carcinoma in situ with solid and cribriform types, associated necrosis and calcifications.  The DCIS spanned 18 mm.  Margins were negative closest distance 0.2 mm from the anterior margin.  Separate focus of atypical ductal hyperplasia with calcifications also noted.  MRI of the breasts on 2/25/2020 showed a large postsurgical seroma in the upper outer quadrant of the left breast but no suspicious residual enhancement at this location or elsewhere in either breast.  She was taken back to the operating room 3/4/2020 for excision of margins with negative pathology for atypia, in situ or invasive disease.    The patient has no prior history of breast cancer.  She has no familial history of breast or ovarian cancers.  The patient has had a previous hysterectomy for treatment of menorrhagia; she still has ovaries in place.  She is unsure if she is postmenopausal and states her GYN lisa labs in November 2019.  The patient has no prior  "history of venous thromboembolism.  She has never had a bone density but her mother had severe osteoporosis.    The patient completed postlumpectomy radiation therapy to the breast.  She initiated tamoxifen 20 mg daily in early May 2020.  She has been tolerating this overall well with no significant vasomotor symptoms.      Patient is seen 12/14/2020 with a 15lb weight gain since her last office visit.  She is quite frustrated stating that she is noticing that she has been more irritable and has a short temper.  She finds this is mainly an internal struggle but is quite unhappy with this in the weight gain.  She reports she has lack of motivation.  She reports having struggled with depression multiple years ago and taking Lexapro with weight gain definitely does not want to be on a medication such as this.  She states a friend of hers is being treated for breast cancer and is using gabapentin and she wondered about this?  She did try Effexor in the past at one point but had some nausea and discontinued this fairly quickly she reports.  This was prior to her breast cancer diagnosis.  She states that she did counseling for multiple years but her counselor has since retired.  She was unaware of our supportive oncology program.  She does do self breast exams and has no areas that she is concerned about currently.  She denies any other new pain.  She has not had her mammogram at this point, it was due in November but she states she did not get a reminder.  Gynecology typically orders this.    Past Medical History:   Diagnosis Date   • Anesthesia complication     \"I JUST CAN'T COME OUT OF IT.\"   • Breast cancer, left (CMS/[facility]) 02/2020   • Cancer (CMS/[facility])     SKIN CANCER-FOREHEAD SQUAMOUS CELL    • Environmental and seasonal allergies    • Herpes    • History of iron deficiency anemia 2016    Menorrhagia prior to hysterectomy   • Kidney stones 2016   • Mammogram abnormal     LEFT   • Stress incontinence         Past " Surgical History:   Procedure Laterality Date   • BREAST BIOPSY Right     Benign   • BREAST BIOPSY Left 2/12/2020    Procedure: BREAST BIOPSY WITH NEEDLE LOCALIZATION;  Surgeon: Harshad Eduardo MD;  Location: [facility] OR;  Service: General;  Laterality: Left;   • BREAST BIOPSY Left 3/4/2020    Procedure: RE EXCISION ANTERIOR MARGIN OF LEFT BREAST LUMPECTOMY CAVITY;  Surgeon: Harshad Eduardo MD;  Location: [facility] MAIN OR;  Service: General;  Laterality: Left;   • BREAST LUMPECTOMY Right     Benign   • ENDOMETRIAL ABLATION  12/01/2010   • KIDNEY STONE SURGERY  2001   • LACRIMAL DUCT PROBING W/ STENT PLACEMENT Right    • TONSILLECTOMY AND ADENOIDECTOMY     • VAGINAL HYSTERECTOMY  2016        Current Outpatient Medications on File Prior to Visit   Medication Sig Dispense Refill   • acetaminophen (TYLENOL) 500 MG tablet Take 1,000 mg by mouth Every 6 (Six) Hours As Needed for Mild Pain .     • azithromycin (ZITHROMAX) 250 MG tablet Take 2 tablets the first day, then 1 tablet daily for 4 days. 6 tablet 0   • HYDROcodone-acetaminophen (NORCO) 7.5-325 MG per tablet Take 1 tablet by mouth Every 4 (Four) Hours As Needed for Moderate Pain . 20 tablet 0   • ondansetron ODT (ZOFRAN-ODT) 4 MG disintegrating tablet Place 1 tablet under the tongue Every 6 (Six) Hours As Needed for Nausea or Vomiting. 15 tablet 0   • sulfamethoxazole-trimethoprim (BACTRIM DS,SEPTRA DS) 800-160 MG per tablet Take 1 tablet by mouth 2 (Two) Times a Day. 14 tablet 0   • tamoxifen (NOLVADEX) 20 MG chemo tablet TAKE 1 TABLET BY MOUTH EVERY DAY 90 tablet 1   • tamsulosin (FLOMAX) 0.4 MG capsule 24 hr capsule Take 1 capsule by mouth Every Night. 7 capsule 0   • valACYclovir (VALTREX) 1000 MG tablet Take 1,000 mg by mouth Daily.  11   • HYDROcodone-acetaminophen (NORCO) 5-325 MG per tablet Take 1-2 tablets by mouth Every 4 (Four) Hours As Needed (Pain). 6 tablet 0     No current facility-administered medications on file prior to visit.          ALLERGIES:    Allergies   Allergen Reactions   • Adhesive Tape Hives and Rash     tegaderm  ekg leads        Social History     Socioeconomic History   • Marital status:      Spouse name: Wayne   • Number of children: 1   • Years of education: Not on file   • Highest education level: Not on file   Occupational History     Employer: SELF-EMPLOYED   Tobacco Use   • Smoking status: Former Smoker     Packs/day: 1.00     Years: 3.00     Pack years: 3.00     Types: Cigarettes     Quit date:      Years since quittin.9   • Smokeless tobacco: Never Used   Substance and Sexual Activity   • Alcohol use: Yes     Alcohol/week: 3.0 standard drinks     Types: 1 Glasses of wine, 1 Cans of beer, 1 Shots of liquor per week   • Drug use: No   • Sexual activity: Defer        Family History   Problem Relation Age of Onset   • Osteoporosis Mother    • Coronary artery disease Father 48         at 54   • Heart disease Father    • Heart attack Father    • Coronary artery disease Maternal Grandmother         fatal MI at 70   • Osteoporosis Maternal Grandmother    • Coronary artery disease Paternal Grandfather    • Thyroid cancer Sister    • Thyroid disease Sister    • Lung cancer Paternal Uncle    • Other Other         Potter syndrome   • Colon cancer Other    • Diabetes Other    • Breast cancer Neg Hx    • Hypotension Neg Hx    • Hypertension Neg Hx    • Malig Hyperthermia Neg Hx         Review of Systems   Constitutional: Positive for fatigue.   HENT: Negative.    Respiratory: Negative.    Cardiovascular: Negative.    Gastrointestinal: Negative.    Endocrine: Positive for cold intolerance and heat intolerance.   Genitourinary: Negative.    Musculoskeletal: Negative.    Skin: Negative.    Allergic/Immunologic: Negative.    Neurological: Negative.    Hematological: Negative.    Psychiatric/Behavioral: Positive for agitation and dysphoric mood. The patient is nervous/anxious.         Decreased libido          Objective  "    Vitals:    12/14/20 1334   BP: 121/86   Pulse: 87   Resp: 16   Temp: 98.2 °F (36.8 °C)   TempSrc: Temporal   SpO2: 97%   Weight: 88.8 kg (195 lb 11.2 oz)   Height: 171.5 cm (67.52\")   PainSc: 0-No pain     Current Status 12/14/2020   ECOG score 1       Physical Exam    CON: pleasant well-developed adult woman  HEENT: no icterus, hearing intact, mask in place  LYMPH: no cervical or supraclavicular lad  CV: RRR, S1S2, no murmur  RESP: cta bilat, no wheezing, no rales  GI: soft, non-tender, no splenomegaly, +bs  MUSC: no edema, normal gait  NEURO: alert and oriented x3, normal strength  PSYCH: normal mood and affect  BREAST: Right breast and axilla normal upon inspection, no abnormality noted.  Left breast tender to palpation left upper quadrant, no nodularity, skin thickening, skin changes noted.  Left axilla benign..      RECENT LABS:  Hematology WBC   Date Value Ref Range Status   12/14/2020 6.34 3.40 - 10.80 10*3/mm3 Final   06/12/2018 5.98 4.50 - 10.70 10*3/mm3 Final     RBC   Date Value Ref Range Status   12/14/2020 4.17 3.77 - 5.28 10*6/mm3 Final   06/12/2018 4.61 3.90 - 5.20 10*6/mm3 Final     Hemoglobin   Date Value Ref Range Status   12/14/2020 13.1 12.0 - 15.9 g/dL Final     Hematocrit   Date Value Ref Range Status   12/14/2020 38.1 34.0 - 46.6 % Final     Platelets   Date Value Ref Range Status   12/14/2020 202 140 - 450 10*3/mm3 Final          Assessment/Plan   1.  Ductal carcinoma in situ of the left breast, intermediate grade with necrosis and calcifications, ER +91 200% of cells, [place] +21 to 30% of cells status post lumpectomy with negative margins  2.  Atypical ductal hyperplasia of the breast    The patient completed postlumpectomy radiation therapy.  She initiated tamoxifen 20 mg daily early May 2020,  anticipate 5 years of therapy if continued tolerance.      Patient is seen 12/14/2020 with tolerance issues to tamoxifen.  She is reporting irritability and mood, hot flashes, fatigue, and weight " gain.  She does continue working out with a  3 times a week and states she tries to make healthy choices in her food and does not feel that she is eating any differently.  She states she will not count calories, she knows she will not stick to that.  She finds her self being aggravated more internally with occasional lashing out episodes.  She does have a history of anxiety and depression trialing Lexapro in the past with weight gain and she does not want to try another medication such as this.  She did try Effexor at one point in the past, many years ago, reports some nausea but stopped the medication quickly.  She asked also about gabapentin.  This is reviewed with Dr. Murphy today and I discussed with the patient once again the possibility of Effexor versus gabapentin.  She wants to think about this and will call the office back.  If she chooses to proceed with the gabapentin we would dose at 100 mg 3 times daily.  The patient and I discussed weaning this up to a total of 3 times daily.  If the patient chooses Effexor, we will start at 37.5 mg nightly.  If she wishes to proceed with the medication we will have her return to the office in 2 months from starting the supportive medication.  She has been referred to supportive oncology    We will set a 4-month follow-up with Dr. Elam to reassess tolerance as well.  The patient was asked to call Dr. Somers's office for her mammogram appointment as this was due last month.      I recommended she continue a monthly self breast exam.  She is due annual mammography after 11/22/2020 and her gynecologist Dr. Somers typically orders this for her.  6-month follow-up requested.

## 2020-12-17 ENCOUNTER — APPOINTMENT (OUTPATIENT)
Dept: WOMENS IMAGING | Facility: HOSPITAL | Age: 55
End: 2020-12-17

## 2020-12-17 PROCEDURE — 77063 BREAST TOMOSYNTHESIS BI: CPT | Performed by: RADIOLOGY

## 2020-12-17 PROCEDURE — 77067 SCR MAMMO BI INCL CAD: CPT | Performed by: RADIOLOGY

## 2021-01-06 ENCOUNTER — TELEPHONE (OUTPATIENT)
Dept: PSYCHIATRY | Facility: HOSPITAL | Age: 56
End: 2021-01-06

## 2021-01-07 ENCOUNTER — OFFICE VISIT (OUTPATIENT)
Dept: INTERNAL MEDICINE | Facility: CLINIC | Age: 56
End: 2021-01-07

## 2021-01-07 VITALS
OXYGEN SATURATION: 98 % | SYSTOLIC BLOOD PRESSURE: 134 MMHG | HEIGHT: 68 IN | BODY MASS INDEX: 28.95 KG/M2 | HEART RATE: 84 BPM | DIASTOLIC BLOOD PRESSURE: 76 MMHG | WEIGHT: 191 LBS

## 2021-01-07 DIAGNOSIS — R53.82 CHRONIC FATIGUE: Primary | ICD-10-CM

## 2021-01-07 DIAGNOSIS — R51.9 CHRONIC NONINTRACTABLE HEADACHE, UNSPECIFIED HEADACHE TYPE: ICD-10-CM

## 2021-01-07 DIAGNOSIS — G89.29 CHRONIC NONINTRACTABLE HEADACHE, UNSPECIFIED HEADACHE TYPE: ICD-10-CM

## 2021-01-07 PROCEDURE — 99214 OFFICE O/P EST MOD 30 MIN: CPT | Performed by: NURSE PRACTITIONER

## 2021-01-07 RX ORDER — PAROXETINE 7.5 MG/1
1 CAPSULE ORAL DAILY
COMMUNITY
Start: 2020-12-18 | End: 2021-12-07

## 2021-01-07 RX ORDER — BUPROPION HYDROCHLORIDE 150 MG/1
150 TABLET ORAL EVERY MORNING
COMMUNITY
Start: 2020-12-17 | End: 2021-12-07

## 2021-01-07 NOTE — PROGRESS NOTES
Subjective   Rachelle Corbin is a 55 y.o. female. Patient is here today for   Chief Complaint   Patient presents with   • Headache     Pt complains of having headaches.    • Fatigue     Pt complains of having fatigue.    .    History of Present Illness   New patient here to establish care.  Health history and questionnaire have been reviewed in its entirety. The patient's previous primary care provider was Dr. Oneill (2018).    C/o fatigue for about a year. She has had breast cancer and is on tamoxifen. Sleeps 7 hours at night. Denies snoring.     C/o frequent headaches. Wakes up with a headache in the morning 5 out of 7 days for several years. She will also have them at random times during the day. They are located on her forehead and radiate to top of head. Denies problems with vision.  works on computer all day. Uses glasses for reading.    Had migraines in the past (in her 20s). Used to have to be hospitalized for them, but hasn't had one that bad in a long time. Tylenol helps. MRI 20+ years ago    The following portions of the patient's history were reviewed and updated as appropriate: allergies, current medications, past family history, past medical history, past social history, past surgical history and problem list.    Review of Systems   Constitutional: Negative.    Respiratory: Negative.    Cardiovascular: Negative.    Neurological: Positive for headaches.       Objective   Vitals:    01/07/21 0849   BP: 134/76   Pulse: 84   SpO2: 98%     Body mass index is 29.47 kg/m².  Physical Exam  Vitals signs and nursing note reviewed.   Constitutional:       Appearance: Normal appearance. She is well-developed.   HENT:      Right Ear: Tympanic membrane and ear canal normal.      Left Ear: Tympanic membrane and ear canal normal.   Neck:      Thyroid: No thyromegaly.   Cardiovascular:      Rate and Rhythm: Normal rate and regular rhythm.      Heart sounds: Normal heart sounds.   Pulmonary:      Effort: Pulmonary effort  is normal.      Breath sounds: Normal breath sounds.   Skin:     General: Skin is warm and dry.   Neurological:      Mental Status: She is alert and oriented to person, place, and time.   Psychiatric:         Mood and Affect: Mood normal.         Speech: Speech normal.         Behavior: Behavior normal.         Thought Content: Thought content normal.         Assessment/Plan   Diagnoses and all orders for this visit:    1. Chronic fatigue (Primary)  -     T4, Free  -     TSH  -     Vitamin D 25 Hydroxy  -     Comprehensive Metabolic Panel  -     Vitamin B12    2. Chronic nonintractable headache, unspecified headache type    Headaches -patient will make an appointment for an eye exam.  If everything is normal, may need a referral to a neurologist since this has been an ongoing issue for years

## 2021-01-08 LAB
25(OH)D3+25(OH)D2 SERPL-MCNC: 30.6 NG/ML (ref 30–100)
ALBUMIN SERPL-MCNC: 4.2 G/DL (ref 3.5–5.2)
ALBUMIN/GLOB SERPL: 1.7 G/DL
ALP SERPL-CCNC: 125 U/L (ref 39–117)
ALT SERPL-CCNC: 20 U/L (ref 1–33)
AST SERPL-CCNC: 29 U/L (ref 1–32)
BILIRUB SERPL-MCNC: 0.3 MG/DL (ref 0–1.2)
BUN SERPL-MCNC: 11 MG/DL (ref 6–20)
BUN/CREAT SERPL: 13.3 (ref 7–25)
CALCIUM SERPL-MCNC: 9.2 MG/DL (ref 8.6–10.5)
CHLORIDE SERPL-SCNC: 107 MMOL/L (ref 98–107)
CO2 SERPL-SCNC: 25.6 MMOL/L (ref 22–29)
CREAT SERPL-MCNC: 0.83 MG/DL (ref 0.57–1)
GLOBULIN SER CALC-MCNC: 2.5 GM/DL
GLUCOSE SERPL-MCNC: 91 MG/DL (ref 65–99)
POTASSIUM SERPL-SCNC: 4.7 MMOL/L (ref 3.5–5.2)
PROT SERPL-MCNC: 6.7 G/DL (ref 6–8.5)
SODIUM SERPL-SCNC: 140 MMOL/L (ref 136–145)
T4 FREE SERPL-MCNC: 1.16 NG/DL (ref 0.93–1.7)
TSH SERPL DL<=0.005 MIU/L-ACNC: 1.98 UIU/ML (ref 0.27–4.2)
VIT B12 SERPL-MCNC: 591 PG/ML (ref 211–946)

## 2021-02-27 ENCOUNTER — IMMUNIZATION (OUTPATIENT)
Dept: VACCINE CLINIC | Facility: HOSPITAL | Age: 56
End: 2021-02-27

## 2021-02-27 PROCEDURE — 91300 HC SARSCOV02 VAC 30MCG/0.3ML IM: CPT | Performed by: INTERNAL MEDICINE

## 2021-02-27 PROCEDURE — 0001A: CPT | Performed by: INTERNAL MEDICINE

## 2021-03-22 ENCOUNTER — IMMUNIZATION (OUTPATIENT)
Dept: VACCINE CLINIC | Facility: HOSPITAL | Age: 56
End: 2021-03-22

## 2021-03-22 PROCEDURE — 0002A: CPT | Performed by: INTERNAL MEDICINE

## 2021-03-22 PROCEDURE — 91300 HC SARSCOV02 VAC 30MCG/0.3ML IM: CPT | Performed by: INTERNAL MEDICINE

## 2021-04-27 ENCOUNTER — OFFICE VISIT (OUTPATIENT)
Dept: INTERNAL MEDICINE | Facility: CLINIC | Age: 56
End: 2021-04-27

## 2021-04-27 VITALS
HEIGHT: 68 IN | OXYGEN SATURATION: 97 % | WEIGHT: 191 LBS | BODY MASS INDEX: 28.95 KG/M2 | HEART RATE: 90 BPM | TEMPERATURE: 97 F | SYSTOLIC BLOOD PRESSURE: 120 MMHG | DIASTOLIC BLOOD PRESSURE: 82 MMHG

## 2021-04-27 DIAGNOSIS — J40 BRONCHITIS: Primary | ICD-10-CM

## 2021-04-27 PROCEDURE — 99213 OFFICE O/P EST LOW 20 MIN: CPT | Performed by: NURSE PRACTITIONER

## 2021-04-27 RX ORDER — AZITHROMYCIN 250 MG/1
TABLET, FILM COATED ORAL
Qty: 6 TABLET | Refills: 0 | Status: SHIPPED | OUTPATIENT
Start: 2021-04-27 | End: 2021-12-07

## 2021-04-27 RX ORDER — GABAPENTIN 100 MG/1
1 CAPSULE ORAL NIGHTLY
COMMUNITY
Start: 2021-03-22 | End: 2021-12-07

## 2021-04-27 NOTE — PROGRESS NOTES
Subjective   Rachelle Corbin is a 55 y.o. female. Patient is here today for   Chief Complaint   Patient presents with   • URI     Pt complains of having a non productive cough, chest congestion, extreme fatigue & fever of  x4 days. Pt stated that she did a covid test over the weekend that was negative.    .    History of Present Illness   C/o cough x 5 days associated chest congestion, fatigue, low grade fever. Some wheezing.   Covid test 3 days ago was negative. (Patient has the email with the result)  She has tried Mucinex with minimal relief. Denies history of seasonal allergies. Tylenol for temp    The following portions of the patient's history were reviewed and updated as appropriate: allergies, current medications, past family history, past medical history, past social history, past surgical history and problem list.    Review of Systems    Objective   Vitals:    04/27/21 1046   BP: 120/82   Pulse: 90   Temp: 97 °F (36.1 °C)   SpO2: 97%     Body mass index is 29.47 kg/m².  Physical Exam  Vitals and nursing note reviewed.   Constitutional:       Appearance: Normal appearance. She is well-developed.   HENT:      Right Ear: Ear canal normal. A middle ear effusion is present.      Left Ear: Ear canal normal. A middle ear effusion is present.      Mouth/Throat:      Mouth: Mucous membranes are moist.      Pharynx: Oropharynx is clear.   Cardiovascular:      Rate and Rhythm: Normal rate and regular rhythm.      Heart sounds: Normal heart sounds.   Pulmonary:      Effort: Pulmonary effort is normal.      Breath sounds: Normal breath sounds.   Skin:     General: Skin is warm and dry.   Neurological:      Mental Status: She is alert and oriented to person, place, and time.   Psychiatric:         Speech: Speech normal.         Behavior: Behavior normal.         Thought Content: Thought content normal.         Assessment/Plan   Diagnoses and all orders for this visit:    1. Bronchitis (Primary)  -     azithromycin  (Zithromax Z-Wyatt) 250 MG tablet; Take 2 tablets by mouth on day 1, then 1 tablet daily on days 2-5  Dispense: 6 tablet; Refill: 0    Increase fluids. Delsym OTC for cough as needed

## 2021-06-14 ENCOUNTER — APPOINTMENT (OUTPATIENT)
Dept: LAB | Facility: HOSPITAL | Age: 56
End: 2021-06-14

## 2021-06-25 ENCOUNTER — APPOINTMENT (OUTPATIENT)
Dept: LAB | Facility: HOSPITAL | Age: 56
End: 2021-06-25

## 2021-07-13 NOTE — TELEPHONE ENCOUNTER
[facility]    Call placed to pt 10 minutes following scheduled in person apt time; no answer. Mailbox is full. Unable to leave message.   no history of blood product transfusion

## 2021-11-13 ENCOUNTER — IMMUNIZATION (OUTPATIENT)
Dept: VACCINE CLINIC | Facility: HOSPITAL | Age: 56
End: 2021-11-13

## 2021-11-13 PROCEDURE — 91300 HC SARSCOV02 VAC 30MCG/0.3ML IM: CPT | Performed by: INTERNAL MEDICINE

## 2021-11-13 PROCEDURE — 0004A ADM SARSCOV2 30MCG/0.3ML BOOSTER: CPT | Performed by: INTERNAL MEDICINE

## 2021-12-06 ENCOUNTER — TELEPHONE (OUTPATIENT)
Dept: ONCOLOGY | Facility: CLINIC | Age: 56
End: 2021-12-06

## 2021-12-06 NOTE — TELEPHONE ENCOUNTER
Returned pt call. She has an appt tomorrow to see her PCP- advised her to keep her appt and if she feels like she needs to be seen by Dr Elam will make that arrangement. She v/u.

## 2021-12-07 ENCOUNTER — TELEPHONE (OUTPATIENT)
Dept: INTERNAL MEDICINE | Facility: CLINIC | Age: 56
End: 2021-12-07

## 2021-12-07 ENCOUNTER — TELEPHONE (OUTPATIENT)
Dept: ONCOLOGY | Facility: CLINIC | Age: 56
End: 2021-12-07

## 2021-12-07 ENCOUNTER — HOSPITAL ENCOUNTER (OUTPATIENT)
Dept: CT IMAGING | Facility: HOSPITAL | Age: 56
Discharge: HOME | End: 2021-12-07
Admitting: NURSE PRACTITIONER

## 2021-12-07 ENCOUNTER — OFFICE VISIT (OUTPATIENT)
Dept: INTERNAL MEDICINE | Facility: CLINIC | Age: 56
End: 2021-12-07

## 2021-12-07 VITALS
HEART RATE: 88 BPM | WEIGHT: 198 LBS | DIASTOLIC BLOOD PRESSURE: 70 MMHG | BODY MASS INDEX: 30.01 KG/M2 | SYSTOLIC BLOOD PRESSURE: 122 MMHG | HEIGHT: 68 IN | OXYGEN SATURATION: 98 %

## 2021-12-07 DIAGNOSIS — R22.2 CHEST MASS: ICD-10-CM

## 2021-12-07 DIAGNOSIS — IMO0001 LUNG NODULE < 6CM ON CT: ICD-10-CM

## 2021-12-07 DIAGNOSIS — R22.2 CHEST MASS: Primary | ICD-10-CM

## 2021-12-07 PROCEDURE — 71260 CT THORAX DX C+: CPT

## 2021-12-07 PROCEDURE — 25010000002 IOPAMIDOL 61 % SOLUTION: Performed by: NURSE PRACTITIONER

## 2021-12-07 PROCEDURE — 99213 OFFICE O/P EST LOW 20 MIN: CPT | Performed by: NURSE PRACTITIONER

## 2021-12-07 RX ADMIN — IOPAMIDOL 75 ML: 612 INJECTION, SOLUTION INTRAVENOUS at 10:22

## 2021-12-07 NOTE — TELEPHONE ENCOUNTER
Called pt and dr kaufman recommend her pcp order mri as she has been physician that did ct, he will see her at [facility], she vu to daryn't, df

## 2021-12-07 NOTE — TELEPHONE ENCOUNTER
Caller: Rachelle Corbin    Relationship: Self    Best call back number: [phone]    What orders are you requesting (i.e. lab or imaging): MRI    In what timeframe would the patient need to come in: ASAP, WITHIN THE NEXT FEW DAYS IF POSSIBLE    Where will you receive your lab/imaging services: [facility]    Additional notes: PATIENT CANNOT GET IN TO SEE HER ONCOLOGIST UNTIL January 3, 2022 AND THEY ADVISED HER TO CALL JOHANNA ADAMS TO GET AN MRI BEFORE FOLLOWING UP WITH HIM.

## 2021-12-07 NOTE — PROGRESS NOTES
Subjective  Answers for HPI/ROS submitted by the patient on 12/4/2021  Please describe your symptoms.: Lump appeared on my left clavicle  Have you had these symptoms before?: No  How long have you been having these symptoms?: 1-4 days  What is the primary reason for your visit?: Aida Corbin is a 56 y.o. female. Patient is here today for   Chief Complaint   Patient presents with   • Mass     Pt complains of having a lump on the left collar bone x3 days.    .  History of Present Illness   C/o lump under left collar bone x 3 days associated with some discomfort. Patient does have a history of breast cancer. She has her mammogram scheduled for Dec 28, [address]      The following portions of the patient's history were reviewed and updated as appropriate: allergies, current medications, past family history, past medical history, past social history, past surgical history and problem list.    Review of Systems    Objective   Vitals:    12/07/21 0833   BP: 122/70   Pulse: 88   SpO2: 98%     Body mass index is 30.55 kg/m².  Physical Exam  Vitals and nursing note reviewed.   Constitutional:       Appearance: Normal appearance. She is well-developed.   Cardiovascular:      Rate and Rhythm: Normal rate and regular rhythm.      Heart sounds: Normal heart sounds.   Pulmonary:      Effort: Pulmonary effort is normal.      Breath sounds: Normal breath sounds.   Chest:      Chest wall: Mass present.          Comments: Palpable mass  Skin:     General: Skin is warm and dry.   Neurological:      Mental Status: She is alert and oriented to person, place, and time.   Psychiatric:         Speech: Speech normal.         Behavior: Behavior normal.         Thought Content: Thought content normal.         Assessment/Plan   Diagnoses and all orders for this visit:    1. Chest mass (Primary)  -     CT Chest With Contrast; Future    Will get a CT of the chest today.  Will wait for recommendation of radiologist to see if any further  testing is needed, such as an MRI

## 2021-12-07 NOTE — TELEPHONE ENCOUNTER
"----- Message from Autumn Borges sent at 12/7/2021  2:56 PM EST -----  Sejal Larson RN Finney, Deborah, RegSched Rep  Caller: Unspecified (Today, 11:48 AM)  Patient has not been seen since 6/20/2021.  Needs appointment with Dr. Elam regarding new finding on Chest CT scan, with several \"lumps in clavicle area\".  Please schedule and contact patient.  Thanks           Dr Elam,    Patient had CT done today. She is current patient. What would you prefer I do? Get her in for a one unit in a 2 wks? Please advise - thank you! Simin    "

## 2021-12-13 ENCOUNTER — TELEPHONE (OUTPATIENT)
Dept: INTERNAL MEDICINE | Facility: CLINIC | Age: 56
End: 2021-12-13

## 2021-12-13 DIAGNOSIS — R22.2 CHEST MASS: ICD-10-CM

## 2021-12-13 DIAGNOSIS — E04.1 THYROID NODULE: Primary | ICD-10-CM

## 2021-12-13 DIAGNOSIS — IMO0001 LUNG NODULE < 6CM ON CT: ICD-10-CM

## 2021-12-13 NOTE — TELEPHONE ENCOUNTER
PATIENT CALLED FOR MRI TEST RESULTS WHEN LOOKED AT. MRI DONE 12/10/21.    PLEASE CALL [phone]

## 2021-12-28 ENCOUNTER — APPOINTMENT (OUTPATIENT)
Dept: WOMENS IMAGING | Facility: HOSPITAL | Age: 56
End: 2021-12-28

## 2021-12-28 PROCEDURE — 77063 BREAST TOMOSYNTHESIS BI: CPT | Performed by: RADIOLOGY

## 2021-12-28 PROCEDURE — 77067 SCR MAMMO BI INCL CAD: CPT | Performed by: RADIOLOGY

## 2022-01-03 ENCOUNTER — LAB (OUTPATIENT)
Dept: LAB | Facility: HOSPITAL | Age: 57
End: 2022-01-03

## 2022-01-03 ENCOUNTER — OFFICE VISIT (OUTPATIENT)
Dept: ONCOLOGY | Facility: CLINIC | Age: 57
End: 2022-01-03

## 2022-01-03 VITALS
BODY MASS INDEX: 31.17 KG/M2 | WEIGHT: 198.6 LBS | HEIGHT: 67 IN | OXYGEN SATURATION: 99 % | HEART RATE: 88 BPM | TEMPERATURE: 97.5 F | DIASTOLIC BLOOD PRESSURE: 91 MMHG | RESPIRATION RATE: 14 BRPM | SYSTOLIC BLOOD PRESSURE: 130 MMHG

## 2022-01-03 DIAGNOSIS — D05.12 DUCTAL CARCINOMA IN SITU (DCIS) OF LEFT BREAST: Primary | ICD-10-CM

## 2022-01-03 DIAGNOSIS — D05.12 DUCTAL CARCINOMA IN SITU (DCIS) OF LEFT BREAST: ICD-10-CM

## 2022-01-03 DIAGNOSIS — R91.8 LUNG NODULES: ICD-10-CM

## 2022-01-03 LAB
BASOPHILS # BLD AUTO: 0.06 10*3/MM3 (ref 0–0.2)
BASOPHILS NFR BLD AUTO: 1 % (ref 0–1.5)
DEPRECATED RDW RBC AUTO: 42.1 FL (ref 37–54)
EOSINOPHIL # BLD AUTO: 0.19 10*3/MM3 (ref 0–0.4)
EOSINOPHIL NFR BLD AUTO: 3.2 % (ref 0.3–6.2)
ERYTHROCYTE [DISTWIDTH] IN BLOOD BY AUTOMATED COUNT: 12.9 % (ref 12.3–15.4)
HCT VFR BLD AUTO: 43.9 % (ref 34–46.6)
HGB BLD-MCNC: 14.8 G/DL (ref 12–15.9)
IMM GRANULOCYTES # BLD AUTO: 0.04 10*3/MM3 (ref 0–0.05)
IMM GRANULOCYTES NFR BLD AUTO: 0.7 % (ref 0–0.5)
LYMPHOCYTES # BLD AUTO: 1.6 10*3/MM3 (ref 0.7–3.1)
LYMPHOCYTES NFR BLD AUTO: 27.3 % (ref 19.6–45.3)
MCH RBC QN AUTO: 30.1 PG (ref 26.6–33)
MCHC RBC AUTO-ENTMCNC: 33.7 G/DL (ref 31.5–35.7)
MCV RBC AUTO: 89.2 FL (ref 79–97)
MONOCYTES # BLD AUTO: 0.46 10*3/MM3 (ref 0.1–0.9)
MONOCYTES NFR BLD AUTO: 7.8 % (ref 5–12)
NEUTROPHILS NFR BLD AUTO: 3.51 10*3/MM3 (ref 1.7–7)
NEUTROPHILS NFR BLD AUTO: 60 % (ref 42.7–76)
NRBC BLD AUTO-RTO: 0 /100 WBC (ref 0–0.2)
PLATELET # BLD AUTO: 290 10*3/MM3 (ref 140–450)
PMV BLD AUTO: 9.3 FL (ref 6–12)
RBC # BLD AUTO: 4.92 10*6/MM3 (ref 3.77–5.28)
WBC NRBC COR # BLD: 5.86 10*3/MM3 (ref 3.4–10.8)

## 2022-01-03 PROCEDURE — 99213 OFFICE O/P EST LOW 20 MIN: CPT | Performed by: INTERNAL MEDICINE

## 2022-01-03 PROCEDURE — 85025 COMPLETE CBC W/AUTO DIFF WBC: CPT

## 2022-01-03 PROCEDURE — 36415 COLL VENOUS BLD VENIPUNCTURE: CPT

## 2022-01-03 NOTE — PROGRESS NOTES
Subjective     REASON FOR FOLLOW-UP:  DCIS of the left breast  Provide an opinion on any further workup or treatment                             REQUESTING PHYSICIAN:  Dr. Eduardo    RECORDS OBTAINED:  Records of the patients history including those obtained from the referring provider were reviewed and summarized in detail.      History of Present Illness   This is a pleasant 55-year-old woman seen today in follow up for diagnosis of ductal carcinoma in situ of the left breast.  The patient had an abnormal screening mammography performed 11/22/2019 showing abnormal calcifications in the upper outer quadrant of the left breast.  A stereotactic guided left biopsy was attempted but unable to be performed secondary to the location and faint character of the calcifications in the posterior one third upper outer quadrant of the left breast.  She was referred to surgery and underwent a needle localization excisional biopsy of the left breast on 2/12/2020 which showed intermediate grade ductal carcinoma in situ with solid and cribriform types, associated necrosis and calcifications.  The DCIS spanned 18 mm.  Margins were negative closest distance 0.2 mm from the anterior margin.  Separate focus of atypical ductal hyperplasia with calcifications also noted.  MRI of the breasts on 2/25/2020 showed a large postsurgical seroma in the upper outer quadrant of the left breast but no suspicious residual enhancement at this location or elsewhere in either breast.  She was taken back to the operating room 3/4/2020 for excision of margins with negative pathology for atypia, in situ or invasive disease.    The patient has no prior history of breast cancer.  She has no familial history of breast or ovarian cancers.  The patient has had a previous hysterectomy for treatment of menorrhagia; she still has ovaries in place.  She is unsure if she is postmenopausal and states her GYN lisa labs in November 2019.  The patient has no prior  "history of venous thromboembolism.  She has never had a bone density but her mother had severe osteoporosis.    The patient completed postlumpectomy radiation therapy to the breast.  She initiated tamoxifen 20 mg daily in early May 2020.  She took tamoxifen through the end of 2020 and then self discontinued secondary to weight gain and vasomotor symptoms.    The patient is seen back today for review of recent imaging for evaluation of a left chest prominence at the sternoclavicular joint.  The patient noted the prominence on self-exam but denies significant pain, fevers or chills.  She has had no changes in the self breast examination.  Her PCP ordered additional evaluation with a CT of the chest performed 12/7/2021 showing prominence of the left clavicle but no underlying expansile lesion.  There were small right thyroid nodules.  Deep to the left clavicle is a 1 cm prominent lymph node but no pathologically enlarged axillary, mediastinal or hilar lymph nodes, no pericardial effusion, no lung nodules.  There were nonspecific pulmonary nodules 0.3 cm.  She then had an MRI of the chest which showed osseous spurring and joint fluid at the sternoclavicular joint consistent with degenerative changes but no evidence of osteomyelitis or metastatic disease.  Again noted a nonspecific left supraclavicular lymph node 1.2 x 1.4 cm.    Past Medical History:   Diagnosis Date   • Anesthesia complication     \"I JUST CAN'T COME OUT OF IT.\"   • Breast cancer, left (HCC) 02/2020   • Cancer (HCC)     SKIN CANCER-FOREHEAD SQUAMOUS CELL    • Environmental and seasonal allergies    • Herpes    • History of iron deficiency anemia 2016    Menorrhagia prior to hysterectomy   • Kidney stones 2016   • Mammogram abnormal     LEFT   • Stress incontinence         Past Surgical History:   Procedure Laterality Date   • BREAST BIOPSY Right     Benign   • BREAST BIOPSY Left 2/12/2020    Procedure: BREAST BIOPSY WITH NEEDLE LOCALIZATION;  Surgeon: " Harshad Eduardo MD;  Location: [facility];  Service: General;  Laterality: Left;   • BREAST BIOPSY Left 3/4/2020    Procedure: RE EXCISION ANTERIOR MARGIN OF LEFT BREAST LUMPECTOMY CAVITY;  Surgeon: Harshad Eduardo MD;  Location: [facility];  Service: General;  Laterality: Left;   • BREAST LUMPECTOMY Right     Benign   • ENDOMETRIAL ABLATION  2010   • KIDNEY STONE SURGERY     • LACRIMAL DUCT PROBING W/ STENT PLACEMENT Right    • TONSILLECTOMY AND ADENOIDECTOMY     • VAGINAL HYSTERECTOMY          Current Outpatient Medications on File Prior to Visit   Medication Sig Dispense Refill   • acetaminophen (TYLENOL) 500 MG tablet Take 1,000 mg by mouth Every 6 (Six) Hours As Needed for Mild Pain .     • BUPROPION HCL PO Take  by mouth Daily.     • valACYclovir (VALTREX) 1000 MG tablet Take 1,000 mg by mouth Daily.  11     No current facility-administered medications on file prior to visit.        ALLERGIES:    Allergies   Allergen Reactions   • Adhesive Tape Hives and Rash     tegaderm  ekg leads        Social History     Socioeconomic History   • Marital status:      Spouse name: Wayne   • Number of children: 1   Tobacco Use   • Smoking status: Former Smoker     Packs/day: 1.00     Years: 3.00     Pack years: 3.00     Types: Cigarettes     Quit date:      Years since quittin.0   • Smokeless tobacco: Never Used   Substance and Sexual Activity   • Alcohol use: Yes     Alcohol/week: 3.0 standard drinks     Types: 1 Glasses of wine, 1 Cans of beer, 1 Shots of liquor per week   • Drug use: No   • Sexual activity: Defer        Family History   Problem Relation Age of Onset   • Osteoporosis Mother    • Coronary artery disease Father 48         at 54   • Heart disease Father    • Heart attack Father    • Coronary artery disease Maternal Grandmother         fatal MI at 70   • Osteoporosis Maternal Grandmother    • Coronary artery disease Paternal Grandfather    • Thyroid cancer  "Sister    • Thyroid disease Sister    • Lung cancer Paternal Uncle    • Other Other         Potter syndrome   • Colon cancer Other    • Diabetes Other    • Breast cancer Neg Hx    • Hypotension Neg Hx    • Hypertension Neg Hx    • Malig Hyperthermia Neg Hx         Review of Systems   Constitutional: Positive for fatigue.   HENT: Negative.    Respiratory: Negative.    Cardiovascular: Negative.    Gastrointestinal: Negative.    Endocrine: Positive for cold intolerance and heat intolerance.   Genitourinary: Negative.    Musculoskeletal: Negative.    Skin: Negative.    Allergic/Immunologic: Negative.    Neurological: Negative.    Hematological: Negative.    Psychiatric/Behavioral: Negative for agitation and dysphoric mood. The patient is not nervous/anxious.           Objective     Vitals:    01/03/22 1426   BP: 130/91   Pulse: 88   Resp: 14   Temp: 97.5 °F (36.4 °C)   TempSrc: Infrared   SpO2: 99%   Weight: 90.1 kg (198 lb 9.6 oz)   Height: 171 cm (67.32\")   PainSc: 0-No pain     Current Status 1/3/2022   ECOG score 0       Physical Exam    CON: pleasant well-developed adult woman  HEENT: no icterus, hearing intact, mask in place  LYMPH: no cervical or supraclavicular lad  CV: RRR, S1S2, no murmur  RESP: cta bilat, no wheezing, no rales  GI: soft, non-tender, no splenomegaly, +bs  MUSC: no edema, normal gait.  The left sternoclavicular joint is prominent compared to the right.  No palpable supraclavicular lymph nodes.  NEURO: alert and oriented x3, normal strength  PSYCH: normal mood and affect        RECENT LABS:  Hematology WBC   Date Value Ref Range Status   01/03/2022 5.86 3.40 - 10.80 10*3/mm3 Final   06/12/2018 5.98 4.50 - 10.70 10*3/mm3 Final     RBC   Date Value Ref Range Status   01/03/2022 4.92 3.77 - 5.28 10*6/mm3 Final   06/12/2018 4.61 3.90 - 5.20 10*6/mm3 Final     Hemoglobin   Date Value Ref Range Status   01/03/2022 14.8 12.0 - 15.9 g/dL Final     Hematocrit   Date Value Ref Range Status   01/03/2022 " 43.9 34.0 - 46.6 % Final     Platelets   Date Value Ref Range Status   01/03/2022 290 140 - 450 10*3/mm3 Final      Imaging reviewed per the HPI    Assessment/Plan   1.  Ductal carcinoma in situ of the left breast, intermediate grade with necrosis and calcifications, ER +91 200% of cells, NJ +21 to 30% of cells status post lumpectomy with negative margins  2.  Atypical ductal hyperplasia of the breast    The patient completed postlumpectomy radiation therapy.  She initiated tamoxifen 20 mg daily early May 2020 and self discontinued end of 2020 secondary to weight gain and vasomotor symptoms.    3.  Prominence of the left sternoclavicular joint: Imaging findings with CT chest and MRI show likely mild degenerative changes of the left sternoclavicular joint with no findings concerning for metastatic disease to the bone or soft tissue.  There is a prominent 1 cm nonspecific left supraclavicular LN likely reactive    4.  Tiny lung nodules likely reactive or granulomatous    Hematology/oncology plan:  The patient was reassured per her recent imaging studies.  I recommended a follow-up CT chest in approximately 4 months to reevaluate the prominent left supraclavicular lymph node and tiny pulmonary nodules all of which are more than likely reactive/inflammatory.  Her PCP has ordered a thyroid ultrasound to evaluate the nodules.

## 2022-01-06 ENCOUNTER — HOSPITAL ENCOUNTER (OUTPATIENT)
Dept: ULTRASOUND IMAGING | Facility: HOSPITAL | Age: 57
Discharge: HOME | End: 2022-01-06
Admitting: NURSE PRACTITIONER

## 2022-01-06 DIAGNOSIS — E04.1 THYROID NODULE: ICD-10-CM

## 2022-01-06 PROCEDURE — 76536 US EXAM OF HEAD AND NECK: CPT

## 2022-02-02 ENCOUNTER — OFFICE VISIT (OUTPATIENT)
Dept: INTERNAL MEDICINE | Facility: CLINIC | Age: 57
End: 2022-02-02

## 2022-02-02 VITALS
OXYGEN SATURATION: 97 % | WEIGHT: 197.6 LBS | TEMPERATURE: 97.8 F | SYSTOLIC BLOOD PRESSURE: 138 MMHG | BODY MASS INDEX: 31.01 KG/M2 | HEART RATE: 110 BPM | DIASTOLIC BLOOD PRESSURE: 92 MMHG | HEIGHT: 67 IN

## 2022-02-02 DIAGNOSIS — R05.9 COUGH: Primary | ICD-10-CM

## 2022-02-02 PROCEDURE — 99214 OFFICE O/P EST MOD 30 MIN: CPT | Performed by: INTERNAL MEDICINE

## 2022-02-02 NOTE — PROGRESS NOTES
Subjective   Rachelle Corbin is a 56 y.o. female.   She has had a cough and wheezing for a week  History of Present Illness   She has had sx for a week  Mostly cough and sore throat and HA.  Low grade temp   No nausea vomitting or diarrhea  She denies any fevers or chills.  No ill contacts.  Her cough is dry but does sound productive after she coughs a while.  She is not coughing much up though    The following portions of the patient's history were reviewed and updated as appropriate: allergies, current medications, past medical history, past social history and problem list.  No ill contacts no history of asthma  Review of Systems   Constitutional: Positive for fatigue. Negative for fever.   HENT: Positive for sore throat.    Respiratory: Positive for cough and wheezing.    Cardiovascular: Negative.    Gastrointestinal: Negative.    Musculoskeletal: Negative.        Objective   Physical Exam  Vitals reviewed.   Constitutional:       Appearance: She is well-developed.   HENT:      Head: Normocephalic and atraumatic.      Right Ear: External ear normal.      Left Ear: External ear normal.   Eyes:      Conjunctiva/sclera: Conjunctivae normal.      Pupils: Pupils are equal, round, and reactive to light.   Neck:      Thyroid: No thyromegaly.      Trachea: No tracheal deviation.   Cardiovascular:      Rate and Rhythm: Normal rate and regular rhythm.      Heart sounds: Normal heart sounds.   Pulmonary:      Effort: Pulmonary effort is normal.      Breath sounds: Normal breath sounds.   Abdominal:      General: Bowel sounds are normal. There is no distension.      Palpations: Abdomen is soft.      Tenderness: There is no abdominal tenderness.   Musculoskeletal:         General: No deformity. Normal range of motion.      Cervical back: Normal range of motion.   Skin:     General: Skin is warm and dry.   Neurological:      Mental Status: She is alert and oriented to person, place, and time.   Psychiatric:         Behavior:  Behavior normal.         Thought Content: Thought content normal.         Judgment: Judgment normal.         Vitals:    02/02/22 0922   BP: 138/92   Pulse: 110   Temp: 97.8 °F (36.6 °C)   SpO2: 97%     Body mass index is 30.65 kg/m².         Assessment/Plan   Diagnoses and all orders for this visit:    1. Cough (Primary)  -     COVID-19,[facility] ROUTINE, NP/OP SWAB IN TRANSPORT MEDIA OR ESWAB 72 HR TAT - Swab, Nasopharynx; Future  -     COVID-19,[facility] ROUTINE, NP/OP SWAB IN TRANSPORT MEDIA OR ESWAB 72 HR TAT - Swab, Nasopharynx  -     HYDROcod Polst-CPM Polst ER (Tussionex Pennkinetic ER) 10-8 MG/5ML ER suspension; Take 5 mL by mouth Every 12 (Twelve) Hours As Needed for Cough.  Dispense: 120 mL; Refill: 0      1.  Cough: She coughed repeatedly during exam.  She does not have significant wheezing no rales or rhonchi on pulmonary exam.  I expect with her symptoms that she probably does have Covid.  I have recommended vitamin C and D.  Recommend she drink plenty of fluids.  We are going to give her some Tussionex for the cough at night and have recommended Delsym during the day.  I have also given her Breztri inhaler 2 puffs twice a day.  To see if that can help her

## 2022-02-03 ENCOUNTER — DOCUMENTATION (OUTPATIENT)
Dept: INTERNAL MEDICINE | Facility: CLINIC | Age: 57
End: 2022-02-03

## 2022-02-03 LAB
LABCORP SARS-COV-2, NAA 2 DAY TAT: NORMAL
SARS-COV-2 RNA RESP QL NAA+PROBE: NOT DETECTED

## 2022-02-03 RX ORDER — AMOXICILLIN AND CLAVULANATE POTASSIUM 875; 125 MG/1; MG/1
1 TABLET, FILM COATED ORAL 2 TIMES DAILY
Qty: 20 TABLET | Refills: 0 | Status: SHIPPED | OUTPATIENT
Start: 2022-02-03 | End: 2022-02-13

## 2022-04-13 ENCOUNTER — OFFICE VISIT (OUTPATIENT)
Dept: INTERNAL MEDICINE | Facility: CLINIC | Age: 57
End: 2022-04-13

## 2022-04-13 VITALS
TEMPERATURE: 97.1 F | HEIGHT: 67 IN | BODY MASS INDEX: 30.92 KG/M2 | DIASTOLIC BLOOD PRESSURE: 80 MMHG | SYSTOLIC BLOOD PRESSURE: 152 MMHG | OXYGEN SATURATION: 97 % | WEIGHT: 197 LBS | HEART RATE: 91 BPM

## 2022-04-13 DIAGNOSIS — J06.9 UPPER RESPIRATORY TRACT INFECTION, UNSPECIFIED TYPE: ICD-10-CM

## 2022-04-13 DIAGNOSIS — J02.9 SORE THROAT: Primary | ICD-10-CM

## 2022-04-13 LAB
EXPIRATION DATE: NORMAL
EXPIRATION DATE: NORMAL
FLUAV AG NPH QL: NEGATIVE
FLUBV AG NPH QL: NEGATIVE
INTERNAL CONTROL: NORMAL
INTERNAL CONTROL: NORMAL
Lab: NORMAL
Lab: NORMAL
S PYO AG THROAT QL: NEGATIVE

## 2022-04-13 PROCEDURE — 87804 INFLUENZA ASSAY W/OPTIC: CPT | Performed by: NURSE PRACTITIONER

## 2022-04-13 PROCEDURE — 99213 OFFICE O/P EST LOW 20 MIN: CPT | Performed by: NURSE PRACTITIONER

## 2022-04-13 PROCEDURE — 87880 STREP A ASSAY W/OPTIC: CPT | Performed by: NURSE PRACTITIONER

## 2022-04-13 RX ORDER — METHYLPREDNISOLONE 4 MG/1
TABLET ORAL
Qty: 21 EACH | Refills: 0 | Status: SHIPPED | OUTPATIENT
Start: 2022-04-13 | End: 2022-05-19

## 2022-04-13 NOTE — PROGRESS NOTES
Subjective   Rachelle Corbin is a 56 y.o. female. Pt comes in with sore throat and headache     History of Present Illness    The patient is here today with c/o sore throat, congestion, cough starting Monday. Low grade fever at 100. Has taken tylenol.  The cough is not bad.     Has not had COVID prior. Did at home test last night neg. Has had 3 COVID vaccines.     Manager at work sick recently.   The following portions of the patient's history were reviewed and updated as appropriate: allergies, current medications, past family history, past medical history, past social history, past surgical history and problem list.    Review of Systems   Constitutional: Positive for diaphoresis, fatigue and fever. Negative for chills.   HENT: Positive for ear pain, postnasal drip, sinus pressure and sore throat. Negative for rhinorrhea.    Respiratory: Positive for cough and shortness of breath (with deep breaths). Negative for wheezing.    Cardiovascular: Negative.    Musculoskeletal: Positive for myalgias.   Neurological: Positive for headache.       Objective   Physical Exam  Constitutional:       Appearance: She is well-developed. She is ill-appearing.   HENT:      Right Ear: Hearing, tympanic membrane, ear canal and external ear normal.      Left Ear: Hearing, tympanic membrane, ear canal and external ear normal.      Mouth/Throat:      Lips: Pink.      Mouth: Mucous membranes are moist.      Pharynx: Oropharynx is clear.   Neck:      Thyroid: No thyromegaly.   Cardiovascular:      Rate and Rhythm: Regular rhythm. Tachycardia present.      Heart sounds: Normal heart sounds.   Pulmonary:      Effort: Pulmonary effort is normal.      Breath sounds: Normal breath sounds.   Musculoskeletal:      Cervical back: Normal range of motion and neck supple.   Lymphadenopathy:      Cervical: Cervical adenopathy present.   Skin:     General: Skin is warm and dry.   Psychiatric:         Behavior: Behavior normal.         Thought Content:  Thought content normal.         Judgment: Judgment normal.         Vitals:    04/13/22 1420   BP: 152/80   Pulse: 91   Temp: 97.1 °F (36.2 °C)   SpO2: 97%     Body mass index is 30.56 kg/m².    Current Outpatient Medications:   •  acetaminophen (TYLENOL) 500 MG tablet, Take 1,000 mg by mouth Every 6 (Six) Hours As Needed for Mild Pain ., Disp: , Rfl:   •  BUPROPION HCL PO, Take  by mouth Daily., Disp: , Rfl:   •  valACYclovir (VALTREX) 1000 MG tablet, Take 1,000 mg by mouth Daily., Disp: , Rfl: 11  •  HYDROcod Polst-CPM Polst ER (Tussionex Pennkinetic ER) 10-8 MG/5ML ER suspension, Take 5 mL by mouth Every 12 (Twelve) Hours As Needed for Cough., Disp: 120 mL, Rfl: 0  •  methylPREDNISolone (MEDROL) 4 MG dose pack, Take as directed on package instructions., Disp: 21 each, Rfl: 0    Assessment/Plan   Diagnoses and all orders for this visit:    1. Sore throat (Primary)  -     POCT rapid strep A  -     POCT Influenza A/B    2. Upper respiratory tract infection, unspecified type  -     methylPREDNISolone (MEDROL) 4 MG dose pack; Take as directed on package instructions.  Dispense: 21 each; Refill: 0  -     QUESTIONNAIRE SERIES                 1. URI- medrol dose pack (take with food), hydrate, rest, mucinex 12 hr, tylenol, off work if able, COVID swab taken, quarantine until results back   Notify for any s/s of worsening infection

## 2022-04-13 NOTE — PATIENT INSTRUCTIONS
1. URI- medrol dose pack (take with food), hydrate, rest, mucinex 12 hr, tylenol, off work if able  Notify for any s/s of worsening infection

## 2022-04-14 LAB
LABCORP SARS-COV-2, NAA 2 DAY TAT: NORMAL
SARS-COV-2 RNA RESP QL NAA+PROBE: NOT DETECTED

## 2022-04-20 ENCOUNTER — TELEPHONE (OUTPATIENT)
Dept: INTERNAL MEDICINE | Facility: CLINIC | Age: 57
End: 2022-04-20

## 2022-04-20 RX ORDER — AZITHROMYCIN 1 G
1 PACKET (EA) ORAL ONCE
Qty: 1 PACKET | Refills: 0 | Status: SHIPPED | OUTPATIENT
Start: 2022-04-20 | End: 2022-04-20

## 2022-04-20 NOTE — TELEPHONE ENCOUNTER
You told pt to call if she did not improve  you saw her on the 13th and gave her a medrol she said she is not any better

## 2022-04-20 NOTE — TELEPHONE ENCOUNTER
Caller: Rachelle Corbin    Relationship: Self    Best call back number: [phone]    What is the best time to reach you: ASAP    Who are you requesting to speak with (clinical staff, provider,  specific staff member): RAIMUNDO ADAMS OR MA    What was the call regarding: PATIENT STATES SHE IS NOT FEELING ANY BETTER    Do you require a callback: YES

## 2022-05-03 ENCOUNTER — APPOINTMENT (OUTPATIENT)
Dept: PET IMAGING | Facility: HOSPITAL | Age: 57
End: 2022-05-03

## 2022-05-09 ENCOUNTER — APPOINTMENT (OUTPATIENT)
Dept: LAB | Facility: HOSPITAL | Age: 57
End: 2022-05-09

## 2022-05-19 ENCOUNTER — OFFICE VISIT (OUTPATIENT)
Dept: INTERNAL MEDICINE | Facility: CLINIC | Age: 57
End: 2022-05-19

## 2022-05-19 VITALS
SYSTOLIC BLOOD PRESSURE: 162 MMHG | HEIGHT: 67 IN | HEART RATE: 84 BPM | WEIGHT: 197.25 LBS | BODY MASS INDEX: 30.96 KG/M2 | DIASTOLIC BLOOD PRESSURE: 90 MMHG | OXYGEN SATURATION: 98 %

## 2022-05-19 DIAGNOSIS — E66.9 OBESITY (BMI 30-39.9): Primary | ICD-10-CM

## 2022-05-19 DIAGNOSIS — R03.0 ELEVATED BP WITHOUT DIAGNOSIS OF HYPERTENSION: ICD-10-CM

## 2022-05-19 PROCEDURE — 99213 OFFICE O/P EST LOW 20 MIN: CPT | Performed by: NURSE PRACTITIONER

## 2022-05-19 NOTE — PROGRESS NOTES
Subjective   Rachelle Corbin is a 56 y.o. female. Patient is here today for   Chief Complaint   Patient presents with   • Weight Gain   .    History of Present Illness   Patient is here to discuss something to help with weight loss. She has tried supplements in the past without success.   Nutrasystem in 2020 and was successful, but then was diagnosed with breast cancer. States that she does work a lot and travels a lot for work.   She does have a  and is exercising regularly.     The following portions of the patient's history were reviewed and updated as appropriate: allergies, current medications, past family history, past medical history, past social history, past surgical history and problem list.    Review of Systems    Objective   Vitals:    05/19/22 1103   BP: 162/90   Pulse: 84   SpO2: 98%     Body mass index is 30.6 kg/m².  Physical Exam  Vitals and nursing note reviewed.   Constitutional:       Appearance: Normal appearance. She is well-developed.   Cardiovascular:      Rate and Rhythm: Normal rate and regular rhythm.      Heart sounds: Normal heart sounds.   Pulmonary:      Effort: Pulmonary effort is normal.      Breath sounds: Normal breath sounds.   Skin:     General: Skin is warm and dry.   Neurological:      Mental Status: She is alert.   Psychiatric:         Speech: Speech normal.         Behavior: Behavior normal.         Thought Content: Thought content normal.         Assessment & Plan   Diagnoses and all orders for this visit:    1. Obesity (BMI 30-39.9) (Primary)  -     Liraglutide (SAXENDA) 18 MG/3ML injection pen; Inject 0.6mg under skin daily for week one, THEN 1.2mg daily for week two, THEN 1.8mg daily for week three, then 2.4mg daily for week four.  Dispense: 3 pen; Refill: 0    2. Elevated BP without diagnosis of hypertension    obesity - will try saxenda. Patient was given a sample to start.    Elevated BP - patient will follow up in one month for a physical. Will recheck  BP at that time

## 2022-05-27 ENCOUNTER — HOSPITAL ENCOUNTER (OUTPATIENT)
Dept: CT IMAGING | Facility: HOSPITAL | Age: 57
Discharge: HOME | End: 2022-05-27
Admitting: INTERNAL MEDICINE

## 2022-05-27 ENCOUNTER — APPOINTMENT (OUTPATIENT)
Dept: PET IMAGING | Facility: HOSPITAL | Age: 57
End: 2022-05-27

## 2022-05-27 DIAGNOSIS — R91.8 LUNG NODULES: ICD-10-CM

## 2022-05-27 DIAGNOSIS — D05.12 DUCTAL CARCINOMA IN SITU (DCIS) OF LEFT BREAST: ICD-10-CM

## 2022-05-27 PROCEDURE — 71260 CT THORAX DX C+: CPT

## 2022-05-27 PROCEDURE — 25010000002 IOPAMIDOL 61 % SOLUTION: Performed by: INTERNAL MEDICINE

## 2022-05-27 RX ADMIN — IOPAMIDOL 75 ML: 612 INJECTION, SOLUTION INTRAVENOUS at 11:53

## 2022-05-31 ENCOUNTER — TELEPHONE (OUTPATIENT)
Dept: INTERNAL MEDICINE | Facility: CLINIC | Age: 57
End: 2022-05-31

## 2022-06-01 ENCOUNTER — TELEMEDICINE (OUTPATIENT)
Dept: INTERNAL MEDICINE | Facility: CLINIC | Age: 57
End: 2022-06-01

## 2022-06-01 DIAGNOSIS — U07.1 COVID-19 VIRUS INFECTION: Primary | ICD-10-CM

## 2022-06-01 PROCEDURE — 99213 OFFICE O/P EST LOW 20 MIN: CPT | Performed by: INTERNAL MEDICINE

## 2022-06-01 NOTE — PROGRESS NOTES
Subjective   Rachelle Corbin is a 56 y.o. female here today for Covid- 19, fever, cough, headache, sore throat and fatigue.   Pt tested positive on 5/30, symptoms started 5/29.     History of Present Illness   She has had a cough and low grade fever and a sore throat and a HA  She is feeling about the same  Extreme fatigue      The following portions of the patient's history were reviewed and updated as appropriate: allergies, current medications, past medical history, past social history and problem list.    Review of Systems    Objective   Physical Exam  Vitals reviewed.   Constitutional:       Appearance: She is well-developed.   HENT:      Head: Normocephalic and atraumatic.      Right Ear: External ear normal.      Left Ear: External ear normal.   Eyes:      Conjunctiva/sclera: Conjunctivae normal.      Pupils: Pupils are equal, round, and reactive to light.   Neck:      Thyroid: No thyromegaly.      Trachea: No tracheal deviation.   Cardiovascular:      Rate and Rhythm: Normal rate and regular rhythm.      Heart sounds: Normal heart sounds.   Pulmonary:      Effort: Pulmonary effort is normal.      Breath sounds: Normal breath sounds.   Abdominal:      General: Bowel sounds are normal. There is no distension.      Palpations: Abdomen is soft.      Tenderness: There is no abdominal tenderness.   Musculoskeletal:         General: No deformity. Normal range of motion.      Cervical back: Normal range of motion.   Skin:     General: Skin is warm and dry.   Neurological:      Mental Status: She is alert and oriented to person, place, and time.   Psychiatric:         Behavior: Behavior normal.         Thought Content: Thought content normal.         Judgment: Judgment normal.         There were no vitals filed for this visit.  There is no height or weight on file to calculate BMI.       Current Outpatient Medications:   •  acetaminophen (TYLENOL) 500 MG tablet, Take 1,000 mg by mouth Every 6 (Six) Hours As Needed for  Mild Pain ., Disp: , Rfl:   •  Liraglutide (SAXENDA) 18 MG/3ML injection pen, Inject 0.6mg under skin daily for week one, THEN 1.2mg daily for week two, THEN 1.8mg daily for week three, then 2.4mg daily for week four., Disp: 3 pen, Rfl: 0  •  valACYclovir (VALTREX) 1000 MG tablet, Take 1,000 mg by mouth Daily., Disp: , Rfl: 11      Assessment & Plan   Diagnoses and all orders for this visit:    1. COVID-19 virus infection (Primary)    1. Covid infection-I was considering pack Slo-Bid because she is still having a fever up to 101 after 3 days but she does not have any risk factors that qualify her to get it.  Her biggest symptom right now is the fatigue and the headache.  I have recommended she drink plenty of fluids try to get up and walk around as tolerated she denies any shortness of breath.  An occasional cough but not anything that bothers her very much.  She will call us back if symptoms worsen or fail to improve

## 2022-06-06 ENCOUNTER — APPOINTMENT (OUTPATIENT)
Dept: LAB | Facility: HOSPITAL | Age: 57
End: 2022-06-06

## 2022-06-06 ENCOUNTER — TELEPHONE (OUTPATIENT)
Dept: INTERNAL MEDICINE | Facility: CLINIC | Age: 57
End: 2022-06-06

## 2022-06-06 ENCOUNTER — OFFICE VISIT (OUTPATIENT)
Dept: ONCOLOGY | Facility: CLINIC | Age: 57
End: 2022-06-06

## 2022-06-06 ENCOUNTER — TELEPHONE (OUTPATIENT)
Dept: ONCOLOGY | Facility: CLINIC | Age: 57
End: 2022-06-06

## 2022-06-06 DIAGNOSIS — D05.12 DUCTAL CARCINOMA IN SITU (DCIS) OF LEFT BREAST: Primary | ICD-10-CM

## 2022-06-06 DIAGNOSIS — R91.8 LUNG NODULES: ICD-10-CM

## 2022-06-06 PROCEDURE — 99441 PR PHYS/QHP TELEPHONE EVALUATION 5-10 MIN: CPT | Performed by: INTERNAL MEDICINE

## 2022-06-06 NOTE — TELEPHONE ENCOUNTER
Caller: Rachelle Corbin    Relationship: Self    Best call back number: [phone]    What medication are you requesting: NEEDLES FOR SAXCENDA      Have you had these symptoms before:    [x] Yes  [] No    Have you been treated for these symptoms before:   [x] Yes  [] No    If a prescription is needed, what is your preferred pharmacy and phone number: [facility] #[id number] - [place], [place] - [address] AT [facility] [phone] [facility] [phone] FX     Additional notes:SHE HAD USED SAMPLES BEFORE THAT HAD THE NEEDLES WITH THE MEDICATION. SHE DOESN'T NOT KNOW THE SIZE.

## 2022-06-06 NOTE — TELEPHONE ENCOUNTER
Caller: Rachelle Corbin    Relationship to patient: Self    Best call back number: [phone]    Chief complaint: PT CALLING TO SEE IF SHE NEEDS TO RESCHEDULE, SHE'S HAD COVID A WEEK AND A HALF, STILL TESTING POSITIVE BUT HAS NOT HAD A FEVER, WARM TRANSFERRED TO [facility] TO SEE IF THEY COULD CHANGE TO A TELEPHONE VISIT.

## 2022-06-06 NOTE — PROGRESS NOTES
Subjective         REASON FOR FOLLOW-UP:  DCIS of the left breast, supraclavicular LN, lung nodules  Provide an opinion on any further workup or treatment                             REQUESTING PHYSICIAN:  Dr. Eduardo    RECORDS OBTAINED:  Records of the patients history including those obtained from the referring provider were reviewed and summarized in detail.      History of Present Illness   This is a pleasant 55-year-old woman seen today in follow up for diagnosis of ductal carcinoma in situ of the left breast.  The patient had an abnormal screening mammography performed 11/22/2019 showing abnormal calcifications in the upper outer quadrant of the left breast.  A stereotactic guided left biopsy was attempted but unable to be performed secondary to the location and faint character of the calcifications in the posterior one third upper outer quadrant of the left breast.  She was referred to surgery and underwent a needle localization excisional biopsy of the left breast on 2/12/2020 which showed intermediate grade ductal carcinoma in situ with solid and cribriform types, associated necrosis and calcifications.  The DCIS spanned 18 mm.  Margins were negative closest distance 0.2 mm from the anterior margin.  Separate focus of atypical ductal hyperplasia with calcifications also noted.  MRI of the breasts on 2/25/2020 showed a large postsurgical seroma in the upper outer quadrant of the left breast but no suspicious residual enhancement at this location or elsewhere in either breast.  She was taken back to the operating room 3/4/2020 for excision of margins with negative pathology for atypia, in situ or invasive disease.    The patient has no prior history of breast cancer.  She has no familial history of breast or ovarian cancers.  The patient has had a previous hysterectomy for treatment of menorrhagia; she still has ovaries in place.  She is unsure if she is postmenopausal and states her GYN lisa labs in  November 2019.  The patient has no prior history of venous thromboembolism.  She has never had a bone density but her mother had severe osteoporosis.    The patient completed postlumpectomy radiation therapy to the breast.  She initiated tamoxifen 20 mg daily in early May 2020.  She took tamoxifen through the end of 2020 and then self discontinued secondary to weight gain and vasomotor symptoms.    The patient had evaluation with imaging for evaluation of a left chest prominence at the sternoclavicular joint.  The patient noted the prominence on self-exam but denies significant pain, fevers or chills.  She has had no changes in the self breast examination.  Her PCP ordered additional evaluation with a CT of the chest performed 12/7/2021 showing prominence of the left clavicle but no underlying expansile lesion.  There were small right thyroid nodules.  Deep to the left clavicle is a 1 cm prominent lymph node but no pathologically enlarged axillary, mediastinal or hilar lymph nodes, no pericardial effusion, no lung nodules.  There were nonspecific pulmonary nodules 0.3 cm.  She then had an MRI of the chest which showed osseous spurring and joint fluid at the sternoclavicular joint consistent with degenerative changes but no evidence of osteomyelitis or metastatic disease.  Again noted a nonspecific left supraclavicular lymph node 1.2 x 1.4 cm.    You have chosen to receive care through a telephone visit. Do you consent to use a telephone visit for your medical care today? {YES This visit has been rescheduled as a phone visit to comply with patient safety concerns in accordance with CDC recommendations. Total time of discussion was 6minutes.    The patient followed up today via phone visit at her request secondary to active COVID-19 infection.  She has congestion, fever, cough and shortness of breath related to COVID-19.  A follow-up CT chest 5/27/2022 showed a stable asymmetric prominent left supraclavicular lymph  "node new tree-in-bud nodularity both lungs right greater than left, stable scattered pulmonary nodules.    Past Medical History:   Diagnosis Date   • Anesthesia complication     \"I JUST CAN'T COME OUT OF IT.\"   • Breast cancer, left (HCC) 02/2020   • Cancer (HCC)     SKIN CANCER-FOREHEAD SQUAMOUS CELL    • Environmental and seasonal allergies    • Herpes    • History of iron deficiency anemia 2016    Menorrhagia prior to hysterectomy   • Kidney stones 2016   • Mammogram abnormal     LEFT   • Stress incontinence         Past Surgical History:   Procedure Laterality Date   • BREAST BIOPSY Right     Benign   • BREAST BIOPSY Left 2/12/2020    Procedure: BREAST BIOPSY WITH NEEDLE LOCALIZATION;  Surgeon: Harshad Eduardo MD;  Location: [facility];  Service: General;  Laterality: Left;   • BREAST BIOPSY Left 3/4/2020    Procedure: RE EXCISION ANTERIOR MARGIN OF LEFT BREAST LUMPECTOMY CAVITY;  Surgeon: Harshad Eduardo MD;  Location: [facility];  Service: General;  Laterality: Left;   • BREAST LUMPECTOMY Right     Benign   • ENDOMETRIAL ABLATION  12/01/2010   • KIDNEY STONE SURGERY  2001   • LACRIMAL DUCT PROBING W/ STENT PLACEMENT Right    • TONSILLECTOMY AND ADENOIDECTOMY     • VAGINAL HYSTERECTOMY  2016        Current Outpatient Medications on File Prior to Visit   Medication Sig Dispense Refill   • acetaminophen (TYLENOL) 500 MG tablet Take 1,000 mg by mouth Every 6 (Six) Hours As Needed for Mild Pain .     • Liraglutide (SAXENDA) 18 MG/3ML injection pen Inject 0.6mg under skin daily for week one, THEN 1.2mg daily for week two, THEN 1.8mg daily for week three, then 2.4mg daily for week four. 3 pen 0   • valACYclovir (VALTREX) 1000 MG tablet Take 1,000 mg by mouth Daily.  11     No current facility-administered medications on file prior to visit.        ALLERGIES:    Allergies   Allergen Reactions   • Adhesive Tape Hives and Rash     tegaderm  ekg leads        Social History     Socioeconomic History "   • Marital status:      Spouse name: Wayne   • Number of children: 1   Tobacco Use   • Smoking status: Former Smoker     Packs/day: 1.00     Years: 3.00     Pack years: 3.00     Types: Cigarettes     Quit date:      Years since quittin.4   • Smokeless tobacco: Never Used   Vaping Use   • Vaping Use: Never used   Substance and Sexual Activity   • Alcohol use: Yes     Alcohol/week: 3.0 standard drinks     Types: 1 Glasses of wine, 1 Cans of beer, 1 Shots of liquor per week   • Drug use: No   • Sexual activity: Defer        Family History   Problem Relation Age of Onset   • Osteoporosis Mother    • Coronary artery disease Father 48         at 54   • Heart disease Father    • Heart attack Father    • Coronary artery disease Maternal Grandmother         fatal MI at 70   • Osteoporosis Maternal Grandmother    • Coronary artery disease Paternal Grandfather    • Thyroid cancer Sister    • Thyroid disease Sister    • Lung cancer Paternal Uncle    • Other Other         Potter syndrome   • Colon cancer Other    • Diabetes Other    • Breast cancer Neg Hx    • Hypotension Neg Hx    • Hypertension Neg Hx    • Malig Hyperthermia Neg Hx         Review of Systems   Constitutional: Positive for fatigue and fever.   HENT: Positive for congestion.    Respiratory: Positive for cough and shortness of breath.    Cardiovascular: Negative.    Gastrointestinal: Negative.    Endocrine: Positive for cold intolerance and heat intolerance.   Genitourinary: Negative.    Musculoskeletal: Negative.    Skin: Negative.    Allergic/Immunologic: Negative.    Neurological: Negative.    Hematological: Negative.    Psychiatric/Behavioral: Negative for agitation and dysphoric mood. The patient is not nervous/anxious.           Objective     There were no vitals filed for this visit.  Current Status 1/3/2022   ECOG score 0       Physical Exam    Deferred-telephone visit      RECENT LABS:  Hematology WBC   Date Value Ref Range Status    01/03/2022 5.86 3.40 - 10.80 10*3/mm3 Final   06/12/2018 5.98 4.50 - 10.70 10*3/mm3 Final     RBC   Date Value Ref Range Status   01/03/2022 4.92 3.77 - 5.28 10*6/mm3 Final   06/12/2018 4.61 3.90 - 5.20 10*6/mm3 Final     Hemoglobin   Date Value Ref Range Status   01/03/2022 14.8 12.0 - 15.9 g/dL Final     Hematocrit   Date Value Ref Range Status   01/03/2022 43.9 34.0 - 46.6 % Final     Platelets   Date Value Ref Range Status   01/03/2022 290 140 - 450 10*3/mm3 Final      Im[facility] [place]    CT Chest With Contrast Diagnostic 5/27/2022 - 1.  No significant change in an asymmetric prominent left  supraclavicular lymph node, which is nonspecific.  2.  New scattered areas of tree-in-bud nodularity in both lungs, right  greater than left, likely infectious/inflammatory. Recommend follow-up  CT chest in 3-6 months.  3.  A few scattered pulmonary nodules measuring up to 0.3 cm are not  significantly changed from 12/7/2021.     Assessment & Plan   1.  Ductal carcinoma in situ of the left breast, intermediate grade with necrosis and calcifications, ER +91 200% of cells, TX +21 to 30% of cells status post lumpectomy with negative margins  2.  Atypical ductal hyperplasia of the breast    The patient completed postlumpectomy radiation therapy.  She initiated tamoxifen 20 mg daily early May 2020 and self discontinued end of 2020 secondary to weight gain and vasomotor symptoms.    3.  Prominence of the left sternoclavicular joint: Imaging findings with CT chest and MRI show likely mild degenerative changes of the left sternoclavicular joint with no findings concerning for metastatic disease to the bone or soft tissue.  There is a prominent 1 cm nonspecific left supraclavicular LN likely reactive.  A follow-up CT chest 5/27/2022 showed stable left supraclavicular lymph node.    4.  Tiny lung nodules likely reactive or granulomatous    5.  Tree-in-bud nodularity both lungs during active  COVID-19    Hematology/oncology plan:  Follow-up 3 to 4 months with repeat CT chest

## 2022-06-07 RX ORDER — PEN NEEDLE, DIABETIC 32 GX 1/4"
1 NEEDLE, DISPOSABLE MISCELLANEOUS DAILY
Qty: 90 EACH | Refills: 1 | Status: SHIPPED | OUTPATIENT
Start: 2022-06-07

## 2022-06-08 DIAGNOSIS — Z00.00 ANNUAL PHYSICAL EXAM: Primary | ICD-10-CM

## 2022-06-10 LAB
ALBUMIN SERPL-MCNC: 4.4 G/DL (ref 3.8–4.9)
ALBUMIN/GLOB SERPL: 1.8 {RATIO} (ref 1.2–2.2)
ALP SERPL-CCNC: 144 IU/L (ref 44–121)
ALT SERPL-CCNC: 19 IU/L (ref 0–32)
AST SERPL-CCNC: 20 IU/L (ref 0–40)
BILIRUB SERPL-MCNC: 0.4 MG/DL (ref 0–1.2)
BUN SERPL-MCNC: 14 MG/DL (ref 6–24)
BUN/CREAT SERPL: 17 (ref 9–23)
CALCIUM SERPL-MCNC: 9.6 MG/DL (ref 8.7–10.2)
CHLORIDE SERPL-SCNC: 107 MMOL/L (ref 96–106)
CHOLEST SERPL-MCNC: 143 MG/DL (ref 100–199)
CO2 SERPL-SCNC: 23 MMOL/L (ref 20–29)
CREAT SERPL-MCNC: 0.81 MG/DL (ref 0.57–1)
EGFRCR SERPLBLD CKD-EPI 2021: 85 ML/MIN/1.73
ERYTHROCYTE [DISTWIDTH] IN BLOOD BY AUTOMATED COUNT: 12.7 % (ref 11.7–15.4)
GLOBULIN SER CALC-MCNC: 2.5 G/DL (ref 1.5–4.5)
GLUCOSE SERPL-MCNC: 100 MG/DL (ref 65–99)
HCT VFR BLD AUTO: 40.7 % (ref 34–46.6)
HDLC SERPL-MCNC: 47 MG/DL
HGB BLD-MCNC: 13.6 G/DL (ref 11.1–15.9)
LDLC SERPL CALC-MCNC: 80 MG/DL (ref 0–99)
LDLC/HDLC SERPL: 1.7 RATIO (ref 0–3.2)
MCH RBC QN AUTO: 29.7 PG (ref 26.6–33)
MCHC RBC AUTO-ENTMCNC: 33.4 G/DL (ref 31.5–35.7)
MCV RBC AUTO: 89 FL (ref 79–97)
NRBC BLD AUTO-RTO: 1 % (ref 0–0)
PLATELET # BLD AUTO: 258 X10E3/UL (ref 150–450)
POTASSIUM SERPL-SCNC: 4.5 MMOL/L (ref 3.5–5.2)
PROT SERPL-MCNC: 6.9 G/DL (ref 6–8.5)
RBC # BLD AUTO: 4.58 X10E6/UL (ref 3.77–5.28)
SODIUM SERPL-SCNC: 143 MMOL/L (ref 134–144)
TRIGL SERPL-MCNC: 84 MG/DL (ref 0–149)
TSH SERPL DL<=0.005 MIU/L-ACNC: 1.18 UIU/ML (ref 0.45–4.5)
VLDLC SERPL CALC-MCNC: 16 MG/DL (ref 5–40)
WBC # BLD AUTO: 3.4 X10E3/UL (ref 3.4–10.8)

## 2022-06-16 ENCOUNTER — OFFICE VISIT (OUTPATIENT)
Dept: INTERNAL MEDICINE | Facility: CLINIC | Age: 57
End: 2022-06-16

## 2022-06-16 VITALS
WEIGHT: 189 LBS | HEIGHT: 67 IN | DIASTOLIC BLOOD PRESSURE: 86 MMHG | BODY MASS INDEX: 29.66 KG/M2 | SYSTOLIC BLOOD PRESSURE: 150 MMHG | HEART RATE: 85 BPM | OXYGEN SATURATION: 98 %

## 2022-06-16 DIAGNOSIS — R03.0 ELEVATED BP WITHOUT DIAGNOSIS OF HYPERTENSION: ICD-10-CM

## 2022-06-16 DIAGNOSIS — Z00.00 HEALTHCARE MAINTENANCE: Primary | ICD-10-CM

## 2022-06-16 DIAGNOSIS — E66.9 OBESITY (BMI 30-39.9): ICD-10-CM

## 2022-06-16 DIAGNOSIS — Z78.0 MENOPAUSE: ICD-10-CM

## 2022-06-16 PROCEDURE — 99396 PREV VISIT EST AGE 40-64: CPT | Performed by: NURSE PRACTITIONER

## 2022-06-16 PROCEDURE — 99213 OFFICE O/P EST LOW 20 MIN: CPT | Performed by: NURSE PRACTITIONER

## 2022-06-16 RX ORDER — SEMAGLUTIDE 1.7 MG/.75ML
1.7 INJECTION, SOLUTION SUBCUTANEOUS WEEKLY
Qty: 3 ML | Refills: 3 | Status: SHIPPED | OUTPATIENT
Start: 2022-06-16 | End: 2022-07-18

## 2022-06-16 NOTE — PROGRESS NOTES
"Subjective   Rachelle Corbin is a 56 y.o. female and is here for a comprehensive physical exam. The patient reports no problems.    Do you take any herbs or supplements that were not prescribed by a doctor? no    Patient is here to follow up on obesity. She is on Saxenda and is doing well. She did have covid recently, so she had stopped taking the medication for a little while.      History:  LMP: No LMP recorded. Patient has had a hysterectomy.  History of breast cancer - follows up with oncology    The following portions of the patient's history were reviewed and updated as appropriate: allergies, current medications, past family history, past medical history, past social history, past surgical history and problem list.    Review of Systems  Do you have pain that bothers you in your daily life? no  A comprehensive review of systems was negative.    Objective   /86 (BP Location: Left arm, Patient Position: Sitting, Cuff Size: Adult)   Pulse 85   Ht 171 cm (67.32\")   Wt 85.7 kg (189 lb)   SpO2 98%   BMI 29.32 kg/m²     General Appearance:    Alert, cooperative, no distress, appears stated age   Head:    Normocephalic, without obvious abnormality, atraumatic   Eyes:    PERRL, conjunctiva/corneas clear, EOM's intact, both eyes   Ears:    Normal TM's and external ear canals, both ears   Nose:   Nares normal, septum midline, mucosa normal, no drainage    or sinus tenderness   Throat:   Lips, mucosa, and tongue normal; teeth and gums normal   Neck:   Supple, symmetrical, trachea midline, no adenopathy;     thyroid:  no enlargement/tenderness/nodules; no carotid    bruit   Back:     Symmetric, no curvature, ROM normal, no CVA tenderness   Lungs:     Clear to auscultation bilaterally, respirations unlabored   Chest Wall:    No tenderness or deformity    Heart:    Regular rate and rhythm, S1 and S2 normal, no murmur       Abdomen:     Soft, non-tender, bowel sounds active all four quadrants,     no masses, no " organomegaly           Extremities:   Extremities normal, atraumatic, no cyanosis or edema   Pulses:   2+ and symmetric all extremities   Skin:   Skin color, texture, turgor normal, no rashes or lesions   Lymph nodes:   Cervical, supraclavicular, and axillary nodes normal   Neurologic:   Grossly intact, normal strength, sensation and reflexes     throughout        Orders Only on 06/08/2022   Component Date Value Ref Range Status   • Glucose 06/09/2022 100 (A) 65 - 99 mg/dL Final   • BUN 06/09/2022 14  6 - 24 mg/dL Final   • Creatinine 06/09/2022 0.81  0.57 - 1.00 mg/dL Final   • EGFR Result 06/09/2022 85  >59 mL/min/1.73 Final   • BUN/Creatinine Ratio 06/09/2022 17  9 - 23 Final   • Sodium 06/09/2022 143  134 - 144 mmol/L Final   • Potassium 06/09/2022 4.5  3.5 - 5.2 mmol/L Final   • Chloride 06/09/2022 107 (A) 96 - 106 mmol/L Final   • Total CO2 06/09/2022 23  20 - 29 mmol/L Final   • Calcium 06/09/2022 9.6  8.7 - 10.2 mg/dL Final   • Total Protein 06/09/2022 6.9  6.0 - 8.5 g/dL Final   • Albumin 06/09/2022 4.4  3.8 - 4.9 g/dL Final   • Globulin 06/09/2022 2.5  1.5 - 4.5 g/dL Final   • A/G Ratio 06/09/2022 1.8  1.2 - 2.2 Final   • Total Bilirubin 06/09/2022 0.4  0.0 - 1.2 mg/dL Final   • Alkaline Phosphatase 06/09/2022 144 (A) 44 - 121 IU/L Final   • AST (SGOT) 06/09/2022 20  0 - 40 IU/L Final   • ALT (SGPT) 06/09/2022 19  0 - 32 IU/L Final   • Total Cholesterol 06/09/2022 143  100 - 199 mg/dL Final   • Triglycerides 06/09/2022 84  0 - 149 mg/dL Final   • HDL Cholesterol 06/09/2022 47  >39 mg/dL Final   • VLDL Cholesterol Erik 06/09/2022 16  5 - 40 mg/dL Final   • LDL Chol Calc ([facility]) 06/09/2022 80  0 - 99 mg/dL Final   • LDL/HDL RATIO 06/09/2022 1.7  0.0 - 3.2 ratio Final    Comment:                                     LDL/HDL Ratio                                              Men  Women                                1/2 Avg.Risk  1.0    1.5                                    Avg.Risk  3.6    3.2                                  2X Avg.Risk  6.2    5.0                                 3X Avg.Risk  8.0    6.1     • WBC 06/09/2022 3.4  3.4 - 10.8 x10E3/uL Final   • RBC 06/09/2022 4.58  3.77 - 5.28 x10E6/uL Final   • Hemoglobin 06/09/2022 13.6  11.1 - 15.9 g/dL Final   • Hematocrit 06/09/2022 40.7  34.0 - 46.6 % Final   • MCV 06/09/2022 89  79 - 97 fL Final   • MCH 06/09/2022 29.7  26.6 - 33.0 pg Final   • MCHC 06/09/2022 33.4  31.5 - 35.7 g/dL Final   • RDW 06/09/2022 12.7  11.7 - 15.4 % Final   • Platelets 06/09/2022 258  150 - 450 x10E3/uL Final   • nRBC 06/09/2022 1 (A) 0 - 0 % Final   • TSH 06/09/2022 1.180  0.450 - 4.500 uIU/mL Final    Comment: No apparent thyroid disorder. Additional testing not indicated. In  rare instances, Secondary Hypothyroidism as well as Subclinical  Hypothyroidism have been reported in some patients with normal TSH  values.       Reviewed labs with patient.       Assessment & Plan   Healthy female exam.      1. Diagnoses and all orders for this visit:    1. Healthcare maintenance (Primary)    2. Obesity (BMI 30-39.9)  -     Semaglutide-Weight Management (Wegovy) 1.7 MG/0.75ML solution auto-injector; Inject 0.75 mL under the skin into the appropriate area as directed 1 (One) Time Per Week.  Dispense: 3 mL; Refill: 3    3. Menopause  -     DEXA Bone Density Axial; Future    4. Elevated BP without diagnosis of hypertension    obesity - patient will continue saxenda. When she gets to the dosage of 1.8 mg, she will switch over to Wegovy 1.7 mg weekly.     Elevated BP - no meds at this time. Patient will continue to work on weight loss.     2. Patient Counseling:  --Nutrition: Stressed importance of moderation in sodium/caffeine intake, saturated fat and cholesterol, caloric balance, sufficient intake of fresh fruits, vegetables, fiber, calcium, iron.  --Exercise: Stressed the importance of regular exercise.   --Injury prevention: Discussed safety belts, safety helmets, smoke detector.    --Dental health: Discussed importance of regular tooth brushing, flossing, and dental visits.  --Immunizations reviewed.    3. Discussed the patient's BMI with her.  The BMI is above average; BMI management plan is completed  4. Follow up in 3 months

## 2022-06-22 DIAGNOSIS — E66.9 OBESITY (BMI 30-39.9): ICD-10-CM

## 2022-06-22 RX ORDER — LIRAGLUTIDE 6 MG/ML
INJECTION, SOLUTION SUBCUTANEOUS
Qty: 3 ML | Refills: 0 | Status: SHIPPED | OUTPATIENT
Start: 2022-06-22 | End: 2022-07-18

## 2022-07-18 ENCOUNTER — TELEPHONE (OUTPATIENT)
Dept: INTERNAL MEDICINE | Facility: CLINIC | Age: 57
End: 2022-07-18

## 2022-07-18 DIAGNOSIS — E66.9 OBESITY (BMI 30-39.9): ICD-10-CM

## 2022-07-18 NOTE — TELEPHONE ENCOUNTER
She said she did fine with the saxenda. She said that the pens are already prefilled pen for a full dose. She said she cant make it be 1mg of the wegovy. Please advise on what to do from here.

## 2022-07-18 NOTE — TELEPHONE ENCOUNTER
Caller: Rachelle Corbin    Relationship: Self    Best call back number: [phone]    What medication are you requesting: ANTI NAUSEA MEDICATION    What are your current symptoms: NAUSEA    How long have you been experiencing symptoms: FEW WEEKS    Have you had these symptoms before:    [] Yes  [x] No    Have you been treated for these symptoms before:   [] Yes  [x] No    If a prescription is needed, what is your preferred pharmacy and phone number: [facility]Y [id number] [place], [place] - [address] AT [facility] [phone] [facility] [phone] FX     Additional notes: PATIENT STATES SHE RECENTLY STARTED TAKING Semaglutide-Weight Management (Wegovy) 1.7 MG/0.75ML solution auto-injector AND HAS BEEN EXPERIENCING NAUSEA EVER SINCE. REQUEST A PRESCRIPTION TO HELP TREAT SYMPTOMS. PLEASE CALL AND ADVISE FURTHER IF NECESSARY

## 2022-07-31 DIAGNOSIS — E66.9 OBESITY (BMI 30-39.9): ICD-10-CM

## 2022-08-01 RX ORDER — LIRAGLUTIDE 6 MG/ML
INJECTION, SOLUTION SUBCUTANEOUS
OUTPATIENT
Start: 2022-08-01

## 2022-09-19 ENCOUNTER — HOSPITAL ENCOUNTER (OUTPATIENT)
Dept: CT IMAGING | Facility: HOSPITAL | Age: 57
Discharge: HOME | End: 2022-09-19
Admitting: INTERNAL MEDICINE

## 2022-09-19 DIAGNOSIS — R91.8 LUNG NODULES: ICD-10-CM

## 2022-09-19 DIAGNOSIS — D05.12 DUCTAL CARCINOMA IN SITU (DCIS) OF LEFT BREAST: ICD-10-CM

## 2022-09-19 PROCEDURE — 25010000002 IOPAMIDOL 61 % SOLUTION: Performed by: INTERNAL MEDICINE

## 2022-09-19 PROCEDURE — 71260 CT THORAX DX C+: CPT

## 2022-09-19 RX ADMIN — IOPAMIDOL 75 ML: 612 INJECTION, SOLUTION INTRAVENOUS at 09:28

## 2022-09-26 ENCOUNTER — LAB (OUTPATIENT)
Dept: LAB | Facility: HOSPITAL | Age: 57
End: 2022-09-26

## 2022-09-26 ENCOUNTER — OFFICE VISIT (OUTPATIENT)
Dept: ONCOLOGY | Facility: CLINIC | Age: 57
End: 2022-09-26

## 2022-09-26 ENCOUNTER — APPOINTMENT (OUTPATIENT)
Dept: LAB | Facility: HOSPITAL | Age: 57
End: 2022-09-26

## 2022-09-26 VITALS
WEIGHT: 182.5 LBS | DIASTOLIC BLOOD PRESSURE: 99 MMHG | RESPIRATION RATE: 14 BRPM | HEIGHT: 67 IN | HEART RATE: 77 BPM | TEMPERATURE: 96.9 F | SYSTOLIC BLOOD PRESSURE: 150 MMHG | OXYGEN SATURATION: 99 % | BODY MASS INDEX: 28.64 KG/M2

## 2022-09-26 DIAGNOSIS — D05.12 DUCTAL CARCINOMA IN SITU (DCIS) OF LEFT BREAST: Primary | ICD-10-CM

## 2022-09-26 DIAGNOSIS — R91.8 LUNG NODULES: ICD-10-CM

## 2022-09-26 PROCEDURE — 99213 OFFICE O/P EST LOW 20 MIN: CPT | Performed by: INTERNAL MEDICINE

## 2022-09-26 NOTE — PROGRESS NOTES
Subjective         REASON FOR FOLLOW-UP:  DCIS of the left breast, supraclavicular LN, lung nodules  Provide an opinion on any further workup or treatment                             REQUESTING PHYSICIAN:  Dr. Eduardo    RECORDS OBTAINED:  Records of the patients history including those obtained from the referring provider were reviewed and summarized in detail.      History of Present Illness   This is a pleasant 56-year-old woman seen today in follow up for diagnosis of ductal carcinoma in situ of the left breast.  The patient had an abnormal screening mammography performed 11/22/2019 showing abnormal calcifications in the upper outer quadrant of the left breast.  A stereotactic guided left biopsy was attempted but unable to be performed secondary to the location and faint character of the calcifications in the posterior one third upper outer quadrant of the left breast.  She was referred to surgery and underwent a needle localization excisional biopsy of the left breast on 2/12/2020 which showed intermediate grade ductal carcinoma in situ with solid and cribriform types, associated necrosis and calcifications.  The DCIS spanned 18 mm.  Margins were negative closest distance 0.2 mm from the anterior margin.  Separate focus of atypical ductal hyperplasia with calcifications also noted.  MRI of the breasts on 2/25/2020 showed a large postsurgical seroma in the upper outer quadrant of the left breast but no suspicious residual enhancement at this location or elsewhere in either breast.  She was taken back to the operating room 3/4/2020 for excision of margins with negative pathology for atypia, in situ or invasive disease.    The patient has no prior history of breast cancer.  She has no familial history of breast or ovarian cancers.  The patient has had a previous hysterectomy for treatment of menorrhagia; she still has ovaries in place.  She is unsure if she is postmenopausal and states her GYN lisa labs in  "November 2019.  The patient has no prior history of venous thromboembolism.  She has never had a bone density but her mother had severe osteoporosis.    The patient completed postlumpectomy radiation therapy to the breast.  She initiated tamoxifen 20 mg daily in early May 2020.  She took tamoxifen through the end of 2020 and then self discontinued secondary to weight gain and vasomotor symptoms.    The patient had evaluation with imaging for evaluation of a left chest prominence at the sternoclavicular joint.  The patient noted the prominence on self-exam but denies significant pain, fevers or chills.  She has had no changes in the self breast examination.  Her PCP ordered additional evaluation with a CT of the chest performed 12/7/2021 showing prominence of the left clavicle but no underlying expansile lesion.  There were small right thyroid nodules.  Deep to the left clavicle is a 1 cm prominent lymph node but no pathologically enlarged axillary, mediastinal or hilar lymph nodes, no pericardial effusion, no lung nodules.  There were nonspecific pulmonary nodules 0.3 cm.  She then had an MRI of the chest which showed osseous spurring and joint fluid at the sternoclavicular joint consistent with degenerative changes but no evidence of osteomyelitis or metastatic disease.  Again noted a nonspecific left supraclavicular lymph node 1.2 x 1.4 cm.      A follow-up CT chest 5/27/2022 showed a stable asymmetric prominent left supraclavicular lymph node new tree-in-bud nodularity both lungs right greater than left, stable scattered pulmonary nodules.    The patient returned today for a follow-up visit and CT scan.  She is doing okay.  She has been losing weight with some intent.  She complains of fatigue.  She has noticed no new or progressive lymphadenopathy.    Past Medical History:   Diagnosis Date   • Anesthesia complication     \"I JUST CAN'T COME OUT OF IT.\"   • Breast cancer, left (HCC) 02/2020   • Cancer (HCC)     " SKIN CANCER-FOREHEAD SQUAMOUS CELL    • Environmental and seasonal allergies    • Herpes    • History of iron deficiency anemia 2016    Menorrhagia prior to hysterectomy   • Kidney stones 2016   • Mammogram abnormal     LEFT   • Stress incontinence         Past Surgical History:   Procedure Laterality Date   • BREAST BIOPSY Right     Benign   • BREAST BIOPSY Left 2/12/2020    Procedure: BREAST BIOPSY WITH NEEDLE LOCALIZATION;  Surgeon: Harshad Eduardo MD;  Location: [facility];  Service: General;  Laterality: Left;   • BREAST BIOPSY Left 3/4/2020    Procedure: RE EXCISION ANTERIOR MARGIN OF LEFT BREAST LUMPECTOMY CAVITY;  Surgeon: Harshad Eduardo MD;  Location: [facility];  Service: General;  Laterality: Left;   • BREAST LUMPECTOMY Right     Benign   • ENDOMETRIAL ABLATION  12/01/2010   • KIDNEY STONE SURGERY  2001   • LACRIMAL DUCT PROBING W/ STENT PLACEMENT Right    • TONSILLECTOMY AND ADENOIDECTOMY     • VAGINAL HYSTERECTOMY  2016        Current Outpatient Medications on File Prior to Visit   Medication Sig Dispense Refill   • acetaminophen (TYLENOL) 500 MG tablet Take 1,000 mg by mouth Every 6 (Six) Hours As Needed for Mild Pain .     • Insulin Pen Needle (BD Pen Needle Micro U/F) 32G X 6 MM misc 1 each Daily. 90 each 1   • Semaglutide-Weight Management 1 MG/0.5ML solution auto-injector Inject 1 mg under the skin into the appropriate area as directed 1 (One) Time Per Week. 2 mL 0   • valACYclovir (VALTREX) 1000 MG tablet Take 1,000 mg by mouth Daily.  11     No current facility-administered medications on file prior to visit.        ALLERGIES:    Allergies   Allergen Reactions   • Adhesive Tape Hives and Rash     tegaderm  ekg leads        Social History     Socioeconomic History   • Marital status:      Spouse name: Wayne   • Number of children: 1   Tobacco Use   • Smoking status: Former Smoker     Packs/day: 1.00     Years: 3.00     Pack years: 3.00     Types: Cigarettes     Quit  "date:      Years since quittin.7   • Smokeless tobacco: Never Used   Vaping Use   • Vaping Use: Never used   Substance and Sexual Activity   • Alcohol use: Yes     Alcohol/week: 3.0 standard drinks     Types: 1 Glasses of wine, 1 Cans of beer, 1 Shots of liquor per week   • Drug use: No   • Sexual activity: Defer        Family History   Problem Relation Age of Onset   • Osteoporosis Mother    • Coronary artery disease Father 48         at 54   • Heart disease Father    • Heart attack Father    • Coronary artery disease Maternal Grandmother         fatal MI at 70   • Osteoporosis Maternal Grandmother    • Coronary artery disease Paternal Grandfather    • Thyroid cancer Sister    • Thyroid disease Sister    • Lung cancer Paternal Uncle    • Other Other         Potter syndrome   • Colon cancer Other    • Diabetes Other    • Breast cancer Neg Hx    • Hypotension Neg Hx    • Hypertension Neg Hx    • Malig Hyperthermia Neg Hx         Review of Systems   Constitutional: Negative for fatigue and fever.   HENT: Negative for congestion.    Respiratory: Negative for cough and shortness of breath.    Cardiovascular: Negative.    Gastrointestinal: Negative.    Endocrine: Negative for cold intolerance and heat intolerance.   Genitourinary: Negative.    Musculoskeletal: Negative.    Skin: Negative.    Allergic/Immunologic: Negative.    Neurological: Negative.    Hematological: Negative.    Psychiatric/Behavioral: Negative for agitation and dysphoric mood. The patient is not nervous/anxious.           Objective     Vitals:    22 1332   BP: 150/99   Pulse: 77   Resp: 14   Temp: 96.9 °F (36.1 °C)   TempSrc: Infrared   SpO2: 99%   Weight: 82.8 kg (182 lb 8 oz)   Height: 170.7 cm (67.21\")  Comment: new ht - no shoes   PainSc: 0-No pain     Current Status 2022   ECOG score 0       Physical Exam    General: Pleasant woman no acute distress  Neck: No palpable left cervical or supraclavicular lymph nodes  MS: " Prominent left sternoclavicular joint  RECENT LABS:  Hematology WBC   Date Value Ref Range Status   06/09/2022 3.4 3.4 - 10.8 x10E3/uL Final     RBC   Date Value Ref Range Status   06/09/2022 4.58 3.77 - 5.28 x10E6/uL Final     Hemoglobin   Date Value Ref Range Status   06/09/2022 13.6 11.1 - 15.9 g/dL Final   01/03/2022 14.8 12.0 - 15.9 g/dL Final     Hematocrit   Date Value Ref Range Status   06/09/2022 40.7 34.0 - 46.6 % Final   01/03/2022 43.9 34.0 - 46.6 % Final     Platelets   Date Value Ref Range Status   06/09/2022 258 150 - 450 x10E3/uL Final   01/03/2022 290 140 - 450 10*3/mm3 Final          CT Chest With Contrast 9/19/2022 - 1. Slight decrease in size of the 1.3 x 0.7 cm left supraclavicular  node. The few smaller nodes adjacently and the shotty axillary nodes are  stable.  2. Interval resolution of previously noted new tiny nodular opacities.  The sub-6 mm pleural-based right upper lobe nodule and the single  bilateral lower lobe micronodules are stable. There is no new  abnormality.    Assessment & Plan   1.  Ductal carcinoma in situ of the left breast, intermediate grade with necrosis and calcifications, ER +91 200% of cells, CO +21 to 30% of cells status post lumpectomy with negative margins    2.  Atypical ductal hyperplasia of the breast    The patient completed postlumpectomy radiation therapy.    She initiated tamoxifen 20 mg daily early May 2020 and self discontinued end of 2020 secondary to weight gain and vasomotor symptoms.    3.  Prominence of the left sternoclavicular joint: Imaging findings with CT chest and MRI show likely mild degenerative changes of the left sternoclavicular joint with no findings concerning for metastatic disease to the bone or soft tissue.  There is a prominent 1 cm nonspecific left supraclavicular LN likely reactive.  A follow-up CT chest 5/27/2022 showed stable left supraclavicular lymph node.  A follow-up CT chest 9/19/2022 showed decreased size of the left  supraclavicular lymph node to 1.3 x 0.7cm the lymph nodes stable.    4.  Tiny lung nodules likely reactive or granulomatous    5.  Tree-in-bud nodularity both lungs during active COVID-19-resolved    Hematology/oncology plan:  The left supraclavicular lymph node is near normal size and decreased in size.  This is likely a reactive lymph node.  The patient will keep a watch on the area and return to clinic as needed.  Gynecology orders her breast screening now.

## 2023-01-17 ENCOUNTER — TELEPHONE (OUTPATIENT)
Dept: INTERNAL MEDICINE | Facility: CLINIC | Age: 58
End: 2023-01-17
Payer: COMMERCIAL

## 2023-01-17 NOTE — TELEPHONE ENCOUNTER
Spoke with patient made her aware she was due for a physical and labs she will call back to schedule.

## 2023-01-24 DIAGNOSIS — Z78.0 MENOPAUSE: Primary | ICD-10-CM

## 2023-05-08 ENCOUNTER — HOSPITAL ENCOUNTER (OUTPATIENT)
Dept: BONE DENSITY | Facility: HOSPITAL | Age: 58
Discharge: HOME | End: 2023-05-08
Admitting: NURSE PRACTITIONER
Payer: COMMERCIAL

## 2023-05-08 DIAGNOSIS — Z78.0 MENOPAUSE: ICD-10-CM

## 2023-05-08 PROCEDURE — 77080 DXA BONE DENSITY AXIAL: CPT

## 2024-08-22 ENCOUNTER — TELEPHONE (OUTPATIENT)
Dept: INTERNAL MEDICINE | Facility: CLINIC | Age: 59
End: 2024-08-22

## 2024-08-22 ENCOUNTER — TELEMEDICINE (OUTPATIENT)
Dept: INTERNAL MEDICINE | Facility: CLINIC | Age: 59
End: 2024-08-22
Payer: COMMERCIAL

## 2024-08-22 VITALS — TEMPERATURE: 99 F

## 2024-08-22 DIAGNOSIS — U07.1 COVID-19: Primary | ICD-10-CM

## 2024-08-22 PROCEDURE — 99213 OFFICE O/P EST LOW 20 MIN: CPT | Performed by: NURSE PRACTITIONER

## 2024-08-22 RX ORDER — BUTALBITAL, ACETAMINOPHEN AND CAFFEINE 50; 325; 40 MG/1; MG/1; MG/1
1 TABLET ORAL EVERY 4 HOURS PRN
Qty: 20 TABLET | Refills: 0 | Status: SHIPPED | OUTPATIENT
Start: 2024-08-22

## 2024-08-22 NOTE — PROGRESS NOTES
Subjective   Rachelle Corbin is a 58 y.o. female. Patient is here today for   Chief Complaint   Patient presents with    Cough    URI    Fatigue          .    Cough    URI   Associated symptoms include coughing.   Fatigue  Associated symptoms include coughing and fatigue.      You have chosen to receive care through a telehealth visit.  Do you consent to use a video/audio connection for your medical care today? Yes    Patient is located at home in [place]  Provider is located in office in [place], [place]    C/o cough associated with chest congestion, fatigue, headache. She tested positive for covid today. She has taken Advil for headache with no relief. No fever.     The following portions of the patient's history were reviewed and updated as appropriate: allergies, current medications, past family history, past medical history, past social history, past surgical history and problem list.    Review of Systems   Constitutional:  Positive for fatigue.   Respiratory:  Positive for cough.        Objective   Vitals:    08/22/24 1551   Temp: 99 °F (37.2 °C)     There is no height or weight on file to calculate BMI.  Physical Exam  Nursing note reviewed.   Constitutional:       Appearance: She is ill-appearing.   Pulmonary:      Effort: Pulmonary effort is normal.   Neurological:      Mental Status: She is oriented to person, place, and time.   Psychiatric:         Mood and Affect: Mood normal.         Behavior: Behavior normal.         Assessment & Plan   Diagnoses and all orders for this visit:    1. COVID-19 (Primary)  -     butalbital-acetaminophen-caffeine (Esgic) -40 MG per tablet; Take 1 tablet by mouth Every 4 (Four) Hours As Needed for Headache.  Dispense: 20 tablet; Refill: 0      Patient will be treated with fioricet for her headache. Increase fluids, rest.

## 2025-01-28 ENCOUNTER — TELEPHONE (OUTPATIENT)
Dept: INTERNAL MEDICINE | Facility: CLINIC | Age: 60
End: 2025-01-28

## 2025-01-28 NOTE — TELEPHONE ENCOUNTER
Hub staff attempted to follow warm transfer process and was unsuccessful     Caller: Rachelle Corbin    Relationship to patient: Self    Best call back number: [phone]     Patient is needing: PATIENT IS CALLING TO GET IN FOR A SAME DAY APPOINTMENT. SHE IS EXPERIENCING SEVERE HEADACHE, FEVER, AND MUSCLE ACHES. [facility] ATTEMPTED TO SCHEDULE, BUT THERE WAS NO AVAILABILITY FOR TODAY. PLEASE ADVISE.

## (undated) DEVICE — 3M™ STERI-STRIP™ REINFORCED ADHESIVE SKIN CLOSURES, R1547, 1/2 IN X 4 IN (12 MM X 100 MM), 6 STRIPS/ENVELOPE: Brand: 3M™ STERI-STRIP™

## (undated) DEVICE — SKIN PREP TRAY W/CHG: Brand: MEDLINE INDUSTRIES, INC.

## (undated) DEVICE — ANTIBACTERIAL UNDYED BRAIDED (POLYGLACTIN 910), SYNTHETIC ABSORBABLE SUTURE: Brand: COATED VICRYL

## (undated) DEVICE — DRSNG SURESITE WNDW 4X4.5

## (undated) DEVICE — GLV SURG PREMIERPRO ORTHO LTX PF SZ8 BRN

## (undated) DEVICE — LOU MINOR PROCEDURE: Brand: MEDLINE INDUSTRIES, INC.

## (undated) DEVICE — NDL HYPO ECLPS SFTY 22G 1 1/2IN

## (undated) DEVICE — 3M™ STERI-STRIP™ COMPOUND BENZOIN TINCTURE 40 BAGS/CARTON 4 CARTONS/CASE C1544: Brand: 3M™ STERI-STRIP™

## (undated) DEVICE — Device: Brand: FABCO

## (undated) DEVICE — ELECTRD BLD EDGE/INSUL1P 2.4X5.1MM STRL

## (undated) DEVICE — SUT SILK 2/0 FS BLK 18IN 685G